# Patient Record
Sex: FEMALE | Race: WHITE | NOT HISPANIC OR LATINO | Employment: OTHER | URBAN - METROPOLITAN AREA
[De-identification: names, ages, dates, MRNs, and addresses within clinical notes are randomized per-mention and may not be internally consistent; named-entity substitution may affect disease eponyms.]

---

## 2017-01-26 DIAGNOSIS — E04.1 NONTOXIC UNINODULAR GOITER: Primary | ICD-10-CM

## 2017-01-26 RX ORDER — LEVOTHYROXINE SODIUM 100 UG/1
TABLET ORAL
Qty: 90 TABLET | Refills: 0 | OUTPATIENT
Start: 2017-01-26

## 2017-01-26 RX ORDER — LEVOTHYROXINE SODIUM 100 UG/1
100 TABLET ORAL DAILY
Qty: 30 TABLET | Refills: 0 | COMMUNITY
Start: 2017-01-26 | End: 2017-02-02

## 2017-01-26 NOTE — TELEPHONE ENCOUNTER
Called in 30 today for pt  Levothyroxine Nuno Simmons  Pt has an appt on 2-2-2017 with LES  Eves  [phone] (home)

## 2017-01-31 ENCOUNTER — TRANSFERRED RECORDS (OUTPATIENT)
Dept: FAMILY MEDICINE | Facility: CLINIC | Age: 67
End: 2017-01-31

## 2017-02-02 ENCOUNTER — OFFICE VISIT (OUTPATIENT)
Dept: FAMILY MEDICINE | Facility: CLINIC | Age: 67
End: 2017-02-02

## 2017-02-02 VITALS
OXYGEN SATURATION: 98 % | WEIGHT: 223 LBS | DIASTOLIC BLOOD PRESSURE: 80 MMHG | HEART RATE: 70 BPM | BODY MASS INDEX: 38.07 KG/M2 | HEIGHT: 64 IN | RESPIRATION RATE: 16 BRPM | SYSTOLIC BLOOD PRESSURE: 120 MMHG | TEMPERATURE: 98.2 F

## 2017-02-02 DIAGNOSIS — Z13.220 SCREENING CHOLESTEROL LEVEL: ICD-10-CM

## 2017-02-02 DIAGNOSIS — Z13.1 SCREENING FOR DIABETES MELLITUS: ICD-10-CM

## 2017-02-02 DIAGNOSIS — L71.9 ROSACEA: ICD-10-CM

## 2017-02-02 DIAGNOSIS — E04.1 NONTOXIC UNINODULAR GOITER: ICD-10-CM

## 2017-02-02 DIAGNOSIS — Z01.419 ENCOUNTER FOR GYNECOLOGICAL EXAMINATION WITHOUT ABNORMAL FINDING: Primary | ICD-10-CM

## 2017-02-02 DIAGNOSIS — M10.00 IDIOPATHIC GOUT, UNSPECIFIED CHRONICITY, UNSPECIFIED SITE: ICD-10-CM

## 2017-02-02 DIAGNOSIS — E66.09 OBESITY DUE TO EXCESS CALORIES, UNSPECIFIED OBESITY SEVERITY: ICD-10-CM

## 2017-02-02 LAB
GLUCOSE SERPL-MCNC: 90 MG/DL (ref 60–99)
HEMOGLOBIN: 15 G/DL (ref 11.7–15.7)

## 2017-02-02 PROCEDURE — 99212 OFFICE O/P EST SF 10 MIN: CPT | Mod: 25 | Performed by: FAMILY MEDICINE

## 2017-02-02 PROCEDURE — 85018 HEMOGLOBIN: CPT | Performed by: FAMILY MEDICINE

## 2017-02-02 PROCEDURE — G0101 CA SCREEN;PELVIC/BREAST EXAM: HCPCS | Performed by: FAMILY MEDICINE

## 2017-02-02 PROCEDURE — 99397 PER PM REEVAL EST PAT 65+ YR: CPT | Performed by: FAMILY MEDICINE

## 2017-02-02 PROCEDURE — 36415 COLL VENOUS BLD VENIPUNCTURE: CPT | Performed by: FAMILY MEDICINE

## 2017-02-02 PROCEDURE — 82947 ASSAY GLUCOSE BLOOD QUANT: CPT | Performed by: FAMILY MEDICINE

## 2017-02-02 RX ORDER — INDOMETHACIN 50 MG/1
50 CAPSULE ORAL
Qty: 90 CAPSULE | Refills: 0 | Status: SHIPPED | OUTPATIENT
Start: 2017-02-02 | End: 2019-05-02

## 2017-02-02 RX ORDER — LEVOTHYROXINE SODIUM 100 UG/1
100 TABLET ORAL DAILY
Qty: 90 TABLET | Refills: 3 | Status: SHIPPED | OUTPATIENT
Start: 2017-02-02 | End: 2018-03-19

## 2017-02-02 NOTE — Clinical Note
[facility]  [facility] [address]  [place]  [address]    February 6, 2017        Nohemi Orlando  [address]  [facility] [address]              Dear Nohemi Orlando      LAB RESULTS:     The results of your recent uric acid for gout control, Cholesterol/HDL Ratio 5.4 (desired result below 5), HDL Cholesterol 37 (desired result above 40) and LDL Cholesterol 136 (desired result below 130) were ABNORMAL and indicate a mild risk for future heart attack or stroke and a real possibility of a gout attack. See next page.    Your thyroid level was NORMAL.    A low fat diet, regular aerobic exercise like walking 30 minutes daily and weight loss is the treatment recommendations at this time. I also would consider a daily medication to help lower your risk for gout.     If you have any further questions or problems, please contact our office at [phone].          Sabrina Jose MD

## 2017-02-02 NOTE — PROGRESS NOTES
SUBJECTIVE:  Nohemi Orlando is an 67 year old  postmenopausal woman   who presents for annual gyn exam. Menopause at age 53. No   bleeding, spotting, or discharge noted.     Estrogen replacement therapy: never  CHAIM exposure: no  History of abnormal Pap smear: No  Family history of uterine or ovarian cancer: No  Regular self breast exam: Yes  History of abnormal mammogram: No  Family history of breast cancer: No  History of abnormal lipids: No      Past Medical History    Glaucoma     Comment: dr Mohr    Nontoxic uninodular goiter     Arthritis     Comment: left knee    Chronic pain     Comment: left knee    Post-menopausal bleeding     Chronic pain of left knee     Complex endometrial hyperplasia with atypia 2013    Atypical endometrial hyperplasia 3/6/2013         Family History    Eye Disorder Mother     Comment: glaucoma    GASTROINTESTINAL DISEASE Brother     Comment: crohns    Cancer - colorectal Brother     Comment: anal cancer     Genetic Disorder Child     Comment: gout         Past Surgical History    C THYR LOBECTOMY+PART REMOV CONTRA      Comment right lobe, colloid adenoma, dr miguel WIN ANESTH, SECTION      HC COLONOSCOPY THRU STOMA, DIAGNOSTIC      Comment negative     ORTHOPEDIC SURGERY      Comment right knee scope    DILATION AND CURETTAGE, HYSTEROSCOPY DIAGNOSTIC, COMBINED  2013    Comment Procedure: COMBINED DILATION AND CURETTAGE, HYSTEROSCOPY DIAGNOSTIC;  DILATION AND CURETTAGE, HYSTEROSCOPY DIAGNOSTIC ;  Surgeon: Patty Walker MD;  Location: [facility] OR    DAVINCI HYSTERECTOMY TOTAL, BILATERAL SALPINGO-OOPHORECTOMY, COMBINED  2013    Comment Procedure: COMBINED DAVINCI HYSTERECTOMY TOTAL, SALPINGO-OOPHORECTOMY;  DAVINCI LAPAROSCOPIC ASSISTED TOTAL HYSTERECTOMY, BILATERAL SALPINGO OOPHORECTOMY;  Surgeon: Manohar Peñaloza MD;  Location: [facility] OR    GLAUCOMA SURGERY  oct 2015    Comment both eyes, stents in,     CATARACT IOL, RT/LT  oct 2015    Comment both eyes,   "      Current Outpatient Prescriptions:  levothyroxine (SYNTHROID/LEVOTHROID) 100 MCG tablet   indomethacin (INDOCIN) 50 MG capsule   No current facility-administered medications for this visit.   -- No Known Allergies        Smoking status: Never Smoker     Smokeless tobacco: Never Used    Alcohol Use: No    Comment: maybe 1 time a year       Review Of Systems  Ears/Nose/Throat: negative  Respiratory: No shortness of breath, dyspnea on exertion, cough, or hemoptysis  Cardiovascular: negative  Gastrointestinal: colon polyps /recheck 2018  Genitourinary: negative    OBJECTIVE:  /80 mmHg  Pulse 70  Temp(Src) 98.2  F (36.8  C) (Oral)  Resp 16  Ht 1.619 m (5' 3.75\")  Wt 101.152 kg (223 lb)  BMI 38.59 kg/m2  SpO2 98%  General appearance: healthy, alert, no distress, cooperative, smiling and over weight  Skin: Skin color, texture, turgor normal. Telangectasia of the face/ rosacea noted  Ears: negative  Nose/Sinuses: Nares normal. Septum midline. Mucosa normal. No drainage or sinus tenderness.  Oropharynx: Lips, mucosa, and tongue normal. Teeth and gums normal.  Neck: Neck supple. No adenopathy. Thyroid symmetric, normal size,, Carotids without bruits.  Lungs: negative, Percussion normal. Good diaphragmatic excursion. Lungs clear  Heart: negative, PMI normal. No lifts, heaves, or thrills. RRR. No murmurs, clicks gallops or rub  Breasts: Inspection negative. No nipple discharge or bleeding. No masses.  Abdomen: Abdomen soft, non-tender. BS normal. No masses, organomegaly, positive findings: obese  Pelvic: External genitalia and vagina normal. Bimanual and rectovaginal normal., positive findings:  vaginal mucosa atrophy, empty pelvis  BMI : Body mass index is 38.59 kg/(m^2).      ASSESSMENT:(Z01.419) Encounter for gynecological examination without abnormal finding  (primary encounter diagnosis)  Plan: VENIPUNC FNGR,HEEL,EAR [[id number]], HEMOGLOBIN         [48662.000], CANCELED: ThinPrep Pap and HPV  ([facility])        " Total calcium intake of 1500 mgm/day, vitamin D 400-800IU/day and a regular weight bearing exercise program for prevention of osteoporosis is recommended treatment at this time.      (E04.1) Nontoxic uninodular goiter  Plan: VENIPUNC FNGR,HEEL,EAR [17251], TSH,         levothyroxine (SYNTHROID/LEVOTHROID) 100 MCG         tablet        Await labs/ refilled one year    (E66.09) Obesity due to excess calories, unspecified obesity severity  Plan: VENIPUNC FNGR,HEEL,EAR [64390], LIPID PANEL,         TSH, Glucose Fasting (BFP)        A low fat diet, regular aerobic exercise like walking 30 minutes daily and weight control is the treatment recommendations at this time      (M10.00) Idiopathic gout, unspecified chronicity, unspecified site  Plan: VENIPUNC FNGR,HEEL,EAR [31162], URIC ACID,         indomethacin (INDOCIN) 50 MG capsule        Diet reviewed. Potential medication side effects were discussed with the patient; let me know if any occur.      (L71.9) Rosacea  Plan: metroNIDAZOLE (METROCREAM) 0.75 % cream        Gentle skin care reviewed/monitor    (Z13.1) Screening for diabetes mellitus  Plan: VENIPUNC FNGR,HEEL,EAR [18289], Glucose Fasting        (BFP)            (Z13.220) Screening cholesterol level  Plan: VENIPUNC FNGR,HEEL,EAR [64564], LIPID PANEL               Dx:  1)  Pap smear, mammogram  2)  Lipids at appropriate intervals    PE:  Reviewed health maintenance including diet, regular exercise,   estrogen replacement and periodic exams.

## 2017-02-02 NOTE — NURSING NOTE
Patient is here for a full physical exam and pap.  Pre-Visit Screening :  Immunizations : up to date    Colonoscopy : is up to date  Mammogram : is up to date  Asthma Action Plan/Test : na  PHQ9/GAD7 : na    BP done on the right arm, with a lg sized cuff.  Pulse - regular  My Chart - declines    CLASSIFICATION OF OVERWEIGHT AND OBESITY BY BMI                         Obesity Class           BMI(kg/m2)  Underweight                                    < 18.5  Normal                                         18.5-24.9  Overweight                                     25.0-29.9  OBESITY                     I                  30.0-34.9                              II                 35.0-39.9  EXTREME OBESITY             III                >40                             Patient's  BMI Body mass index is 38.59 kg/(m^2).  http://hin.nhlbi.nih.gov/menuplanner/menu.cgi  Questioned patient about current smoking habits.  Pt. has never smoked.  ETOH screening:  Questions:  1-How often do you have a drink containing alcohol?                             0 times per never  2-How many drinks containing alcohol do you have on a typical day when you are         Drinking?                              never   3- How often do you have 5 or more drinks on one occasion?                              never per never    Have you ever:  @None of the patient's responses to the CAGE screening were positive / Negative CAGE score@

## 2017-02-02 NOTE — PATIENT INSTRUCTIONS
Total calcium intake of 1500 mgm/day, vitamin D 400-800IU/day and a regular weight bearing exercise program for prevention of osteoporosis is recommended treatment at this time.    A low fat diet, regular aerobic exercise like walking 30 minutes daily and weight control is the treatment recommendations at this time

## 2017-02-02 NOTE — MR AVS SNAPSHOT
After Visit Summary   2/2/2017    Nohemi Orlando    MRN: [id number]           Patient Information     Date Of Birth          1950        Visit Information        Provider Department      2/2/2017 10:15 AM Sabrina Jose MD [facility]        Today's Diagnoses     Encounter for gynecological examination without abnormal finding    -  1     Nontoxic uninodular goiter         Obesity due to excess calories, unspecified obesity severity         Screening for diabetes mellitus         Screening cholesterol level         Idiopathic gout, unspecified chronicity, unspecified site           Care Instructions    Total calcium intake of 1500 mgm/day, vitamin D 400-800IU/day and a regular weight bearing exercise program for prevention of osteoporosis is recommended treatment at this time.    A low fat diet, regular aerobic exercise like walking 30 minutes daily and weight control is the treatment recommendations at this time          Follow-ups after your visit        Who to contact     If you have questions or need follow up information about today's clinic visit or your schedule please contact [facility] directly at [phone].  Normal or non-critical lab and imaging results will be communicated to you by MyChart, letter or phone within 4 business days after the clinic has received the results. If you do not hear from us within 7 days, please contact the clinic through JamHub[employer] or phone. If you have a critical or abnormal lab result, we will notify you by phone as soon as possible.  Submit refill requests through Leikr or call your pharmacy and they will forward the refill request to us. Please allow 3 business days for your refill to be completed.          Additional Information About Your Visit        Care EveryWhere ID     This is your Care EveryWhere ID. This could be used by other organizations to access your [facility] medical records  [id number]    "     Your Vitals Were     Pulse Temperature Respirations Height BMI (Body Mass Index) Pulse Oximetry    70 98.2  F (36.8  C) (Oral) 16 1.619 m (5' 3.75\") 38.59 kg/m2 98%       Blood Pressure from Last 3 Encounters:   02/02/17 120/80   01/06/16 138/80   12/14/15 120/80    Weight from Last 3 Encounters:   02/02/17 101.152 kg (223 lb)   01/06/16 102.785 kg (226 lb 9.6 oz)   12/14/15 103.874 kg (229 lb)              We Performed the Following     Glucose Fasting (BFP)     HEMOGLOBIN [89431.000]     LIPID PANEL     TSH     URIC ACID     VENIPUNC FNGR,HEEL,EAR [[id number]]        Primary Care Provider Office Phone # Fax #    Sabrina Jose [phone]       [facility] [address] [facility]  [address]  [facility] [address]        Thank you!     Thank you for choosing [facility]  for your care. Our goal is always to provide you with excellent care. Hearing back from our patients is one way we can continue to improve our services. Please take a few minutes to complete the written survey that you may receive in the mail after your visit with us. Thank you!             Your Updated Medication List - Protect others around you: Learn how to safely use, store and throw away your medicines at www.disposemymeds.org.          This list is accurate as of: 2/2/17 11:14 AM.  Always use your most recent med list.                   Brand Name Dispense Instructions for use    indomethacin 50 MG capsule    INDOCIN    30 capsule    Take 1 capsule (50 mg) by mouth 3 times daily (with meals)       levothyroxine 100 MCG tablet    SYNTHROID/LEVOTHROID    30 tablet    Take 1 tablet (100 mcg) by mouth daily         "

## 2017-02-03 LAB
CHOLEST SERPL-MCNC: 199 MG/DL (ref 125–200)
CHOLEST/HDLC SERPL: 5.4 (CALC)
HDLC SERPL-MCNC: 37 MG/DL
LDLC SERPL CALC-MCNC: 136 MG/DL (CALC)
NONHDLC SERPL-MCNC: 162 MG/DL (CALC)
TRIGL SERPL-MCNC: 131 MG/DL
TSH SERPL-ACNC: 0.67 MIU/L (ref 0.4–4.5)
URATE SERPL-MCNC: 9.1 MG/DL (ref 2.5–7)

## 2018-03-16 ENCOUNTER — TRANSFERRED RECORDS (OUTPATIENT)
Dept: FAMILY MEDICINE | Facility: CLINIC | Age: 68
End: 2018-03-16

## 2018-03-19 DIAGNOSIS — L71.9 ROSACEA: ICD-10-CM

## 2018-03-19 DIAGNOSIS — E04.1 NONTOXIC UNINODULAR GOITER: ICD-10-CM

## 2018-03-19 RX ORDER — LEVOTHYROXINE SODIUM 100 UG/1
TABLET ORAL
Qty: 30 TABLET | Refills: 0 | Status: SHIPPED | OUTPATIENT
Start: 2018-03-19 | End: 2018-04-02

## 2018-03-19 NOTE — TELEPHONE ENCOUNTER
Pending Prescriptions:                       Disp   Refills    metroNIDAZOLE (METROCREAM) 0.75 % cream [*45 g                Sig: APPLY EXTERNALLY TO THE AFFECTED AREA TWICE DAILY    levothyroxine (SYNTHROID/LEVOTHROID) 100 *30 tab*0            Sig: TAKE 1 TABLET(100 MCG) BY MOUTH DAILY    Will you review for LES please:    Pt has a px on 4-2-2018 with LES  Please fax 30 days worth  Please fax and close encounter  Eves  [phone] (home)

## 2018-04-02 ENCOUNTER — OFFICE VISIT (OUTPATIENT)
Dept: FAMILY MEDICINE | Facility: CLINIC | Age: 68
End: 2018-04-02

## 2018-04-02 VITALS
DIASTOLIC BLOOD PRESSURE: 88 MMHG | BODY MASS INDEX: 38.31 KG/M2 | TEMPERATURE: 97.8 F | SYSTOLIC BLOOD PRESSURE: 138 MMHG | HEART RATE: 66 BPM | HEIGHT: 64 IN | RESPIRATION RATE: 16 BRPM | WEIGHT: 224.4 LBS | OXYGEN SATURATION: 98 %

## 2018-04-02 DIAGNOSIS — Z01.411 ENCOUNTER FOR GYNECOLOGICAL EXAMINATION WITH ABNORMAL FINDING: Primary | ICD-10-CM

## 2018-04-02 DIAGNOSIS — M10.071 ACUTE IDIOPATHIC GOUT OF RIGHT FOOT: ICD-10-CM

## 2018-04-02 DIAGNOSIS — M17.12 PRIMARY LOCALIZED OSTEOARTHROSIS OF LEFT LOWER LEG: ICD-10-CM

## 2018-04-02 DIAGNOSIS — E04.1 NONTOXIC UNINODULAR GOITER: ICD-10-CM

## 2018-04-02 DIAGNOSIS — L71.9 ROSACEA: ICD-10-CM

## 2018-04-02 DIAGNOSIS — Z13.1 SCREENING FOR DIABETES MELLITUS: ICD-10-CM

## 2018-04-02 DIAGNOSIS — Z13.220 SCREENING CHOLESTEROL LEVEL: ICD-10-CM

## 2018-04-02 DIAGNOSIS — Z23 NEED FOR VACCINATION: ICD-10-CM

## 2018-04-02 DIAGNOSIS — E66.09 OBESITY DUE TO EXCESS CALORIES, UNSPECIFIED OBESITY SEVERITY: ICD-10-CM

## 2018-04-02 DIAGNOSIS — M67.432 GANGLION OF WRIST, LEFT: ICD-10-CM

## 2018-04-02 LAB
% GRANULOCYTES: 61.7 %
HCT VFR BLD AUTO: 44.4 % (ref 35–47)
HEMOGLOBIN: 15.1 G/DL (ref 11.7–15.7)
LYMPHOCYTES NFR BLD AUTO: 25.7 %
MCH RBC QN AUTO: 30.3 PG (ref 26–33)
MCHC RBC AUTO-ENTMCNC: 34 G/DL (ref 31–36)
MCV RBC AUTO: 89.2 FL (ref 78–100)
MONOCYTES NFR BLD AUTO: 12.6 %
PLATELET COUNT - QUEST: 280 10^9/L (ref 150–375)
RBC # BLD AUTO: 4.98 10*12/L (ref 3.8–5.2)
WBC # BLD AUTO: 7.1 10*9/L (ref 4–11)

## 2018-04-02 PROCEDURE — 99212 OFFICE O/P EST SF 10 MIN: CPT | Mod: 25 | Performed by: FAMILY MEDICINE

## 2018-04-02 PROCEDURE — 85025 COMPLETE CBC W/AUTO DIFF WBC: CPT | Performed by: FAMILY MEDICINE

## 2018-04-02 PROCEDURE — 90732 PPSV23 VACC 2 YRS+ SUBQ/IM: CPT | Performed by: FAMILY MEDICINE

## 2018-04-02 PROCEDURE — 90750 HZV VACC RECOMBINANT IM: CPT | Performed by: FAMILY MEDICINE

## 2018-04-02 PROCEDURE — 36415 COLL VENOUS BLD VENIPUNCTURE: CPT | Performed by: FAMILY MEDICINE

## 2018-04-02 PROCEDURE — G0009 ADMIN PNEUMOCOCCAL VACCINE: HCPCS | Mod: 59 | Performed by: FAMILY MEDICINE

## 2018-04-02 PROCEDURE — 90471 IMMUNIZATION ADMIN: CPT | Performed by: FAMILY MEDICINE

## 2018-04-02 PROCEDURE — G0101 CA SCREEN;PELVIC/BREAST EXAM: HCPCS | Performed by: FAMILY MEDICINE

## 2018-04-02 PROCEDURE — 99397 PER PM REEVAL EST PAT 65+ YR: CPT | Performed by: FAMILY MEDICINE

## 2018-04-02 RX ORDER — LEVOTHYROXINE SODIUM 100 UG/1
TABLET ORAL
Qty: 90 TABLET | Refills: 3 | Status: SHIPPED | OUTPATIENT
Start: 2018-04-02 | End: 2019-05-02

## 2018-04-02 NOTE — PATIENT INSTRUCTIONS
Total calcium intake of 1500 mgm/day, vitamin D 400-800IU/day and a regular weight bearing exercise program for prevention of osteoporosis is recommended treatment at this time. Agree with your weight loss plan/ very important    Comment: await thyroid level        Refilled one year     Rosacea  Plan: metroNIDAZOLE (METROCREAM) 0.75 % cream refilled        DERMATOLOGY REFERRAL        Schedule dermatology consultation     Acute idiopathic gout of  Plan: URIC ACID ([facility])        Follow diet     Primary localized osteoarthrosis of left lower leg and your ganglion  Plan: ? Back to DR Nils Madrigal, [facility] if problem

## 2018-04-02 NOTE — PROGRESS NOTES
SUBJECTIVE:  Nohemi Orlando is an 68 year old  postmenopausal woman   who presents for annual gyn exam. She would like to refill her thyroid medication, gout medication and rosacea care understanding this is not a part of a preventative exam.    Her left knee is painful with prolonged walking using ibuprofen, one gout attack in left toe this year treated with indocin and diet, bump on her left wrist for many years/ sometimes thumb pain     Menopause at age 53. No   bleeding, spotting, or discharge noted.     Estrogen replacement therapy: never  CHAIM exposure: no  History of abnormal Pap smear: No  Family history of uterine or ovarian cancer: No  Regular self breast exam: Yes  History of abnormal mammogram: No  Family history of breast cancer: No  History of abnormal lipids: No      Past Medical History:   Diagnosis Date     Acute idiopathic gout of right foot 2018     Allergic rhinitis      Problem list name updated by automated process. Provider to review     Arthritis     left knee     Atypical endometrial hyperplasia 3/6/2013     Chronic pain     left knee     Chronic pain of left knee      Glaucoma     dr Mohr     Nontoxic uninodular goiter      Obesity due to excess calories, unspecified obesity severity 2017     Open-angle glaucoma      Problem list name updated by automated process. Provider to review     Rosacea 2017         Family History    Eye Disorder Mother     Comment: glaucoma    GASTROINTESTINAL DISEASE Brother     Comment: crohns    Cancer - colorectal Brother     Comment: anal cancer     Genetic Disorder Child     Comment: gout       Past Surgical History:  No date: C ANESTH, SECTION  1987: C THYR LOBECTOMY+PART REMOV CONTRA     Comment: right lobe, colloid adenoma, dr rivera  oct 2015: CATARACT IOL, RT/LT     Comment: both eyes,   2013: DAVINCI HYSTERECTOMY TOTAL, BILATERAL SALPINGO*     Comment: Procedure: COMBINED DAVINCI HYSTERECTOMY               TOTAL,  "SALPINGO-OOPHORECTOMY;  LAURA               LAPAROSCOPIC ASSISTED TOTAL HYSTERECTOMY,               BILATERAL SALPINGO OOPHORECTOMY;  Surgeon:               Manohar Peñaloza MD;  Location:  OR  2/13/2013: DILATION AND CURETTAGE, HYSTEROSCOPY DIAGNOSTI*     Comment: Procedure: COMBINED DILATION AND CURETTAGE,               HYSTEROSCOPY DIAGNOSTIC;  DILATION AND               CURETTAGE, HYSTEROSCOPY DIAGNOSTIC ;  Surgeon:               Patty Walker MD;  Location:  OR  oct 2015: GLAUCOMA SURGERY     Comment: both eyes, stents in,   1999: HC COLONOSCOPY THRU STOMA, DIAGNOSTIC     Comment: negative   No date: ORTHOPEDIC SURGERY     Comment: right knee scope    Current Outpatient Prescriptions:  metroNIDAZOLE (METROCREAM) 0.75 % cream   levothyroxine (SYNTHROID/LEVOTHROID) 100 MCG tablet   indomethacin (INDOCIN) 50 MG capsule   No current facility-administered medications for this visit.    -- No Known Allergies        Smoking status: Never Smoker    Smokeless tobacco: Never Used    Alcohol use No    Comment: maybe 1 time a year       Review Of Systems  Ears/Nose/Throat: hypothyroid/ allergies  Respiratory: No shortness of breath, dyspnea on exertion, cough, or hemoptysis  Cardiovascular: negative  Gastrointestinal: colon polyps  Genitourinary: negative  MS: osteoarthritis of the knees/ ankle and feet/ ? Gout/ h/o left knee scope    OBJECTIVE:  /90 (BP Location: Right arm, Patient Position: Chair, Cuff Size: Adult Large)  Pulse 66  Temp 97.8  F (36.6  C) (Oral)  Resp 16  Ht 1.619 m (5' 3.75\")  Wt 101.8 kg (224 lb 6.4 oz)  LMP  (LMP Unknown)  SpO2 98%  Breastfeeding? No  BMI 38.82 kg/m2  General appearance: healthy, alert, no distress, cooperative, smiling and over weight  Skin: positives: facial papules with telangiectasias over the nose and cheek  Ears: negative  Nose/Sinuses: Nares normal. Septum midline. Mucosa normal. No drainage or sinus tenderness.  Oropharynx: Lips, mucosa, and tongue " normal. Teeth and gums normal.  Neck: Neck supple. No adenopathy. Thyroid symmetric, normal size,, Carotids without bruits.  Lungs: negative, Percussion normal. Good diaphragmatic excursion. Lungs clear  Heart: negative, PMI normal. No lifts, heaves, or thrills. RRR. No murmurs, clicks gallops or rub  Breasts: Inspection negative. No nipple discharge or bleeding. No masses.  Abdomen: Abdomen soft, non-tender. BS normal. No masses, organomegaly, positive findings: obese  Pelvic: External genitalia and vagina normal. Bimanual and rectovaginal normal., positive findings:  Empty pelvis  BMI : Body mass index is 38.82 kg/(m^2).    ASSESSMENT:(Z01.411) Encounter for gynecological examination with abnormal finding  (primary encounter diagnosis)  Plan: VENIPUNC FNGR,HEEL,EAR [59868], AUTO         HEMOGRAM/PLATE/DIFF [64045.000]        Total calcium intake of 1500 mgm/day, vitamin D 400-800IU/day and a regular weight bearing exercise program for prevention of osteoporosis is recommended treatment at this time.      (E04.1) Nontoxic uninodular goiter  Comment: await thyroid level  Plan: VENIPUNC FNGR,HEEL,EAR [32386], TSH,         levothyroxine (SYNTHROID/LEVOTHROID) 100 MCG         tablet        Refilled one year    (L71.9) Rosacea  Plan: metroNIDAZOLE (METROCREAM) 0.75 % cream,         DERMATOLOGY REFERRAL        Schedule dermatology consultation    (M10.071) Acute idiopathic gout of right foot  Plan: URIC ACID ([facility])        Follow diet    (M17.12) Primary localized osteoarthrosis of left lower leg  Plan: ? Back to [facility]    (E66.09) Obesity due to excess calories, unspecified obesity severity  Plan: VENIPUNC FNGR,HEEL,EAR [18715], TSH, LIPID         PANEL, COMPREHENSIVE METABOLIC PANEL        Weight loss important    (Z23) Need for vaccination  Plan: PNEUMOCOCCAL VACCINE,ADULT,SQ OR IM, VACCINE         ADMINISTRATION, EACH ADDITIONAL, VACCINE         ADMINISTRATION, INITIAL, C ZOSTER VACCINE          RECOMBINANT ADJUVANTED IM NJX            (Z13.220) Screening cholesterol level  Plan: VENIPUNC FNGR,HEEL,EAR [23583], LIPID PANEL            (Z13.1) Screening for diabetes mellitus  Plan: VENIPUNC FNGR,HEEL,EAR [41012], COMPREHENSIVE         METABOLIC PANEL                Dx:  1)  Pap smear, mammogram  2)  Lipids at appropriate intervals    PE:  Reviewed health maintenance including diet, regular exercise,   estrogen replacement and periodic exams.referral derma

## 2018-04-02 NOTE — MR AVS SNAPSHOT
After Visit Summary   4/2/2018    Nohemi Orlando    MRN: [id number]           Patient Information     Date Of Birth          1950        Visit Information        Provider Department      4/2/2018 9:30 AM Sabrina Jose MD [facility]        Today's Diagnoses     Encounter for gynecological examination with abnormal finding    -  1    Nontoxic uninodular goiter        Rosacea        Acute idiopathic gout of right foot        Primary localized osteoarthrosis of left lower leg        Obesity due to excess calories, unspecified obesity severity        Ganglion of wrist, left        Screening cholesterol level        Screening for diabetes mellitus        Need for vaccination          Care Instructions            Total calcium intake of 1500 mgm/day, vitamin D 400-800IU/day and a regular weight bearing exercise program for prevention of osteoporosis is recommended treatment at this time. Agree with your weight loss plan/ very important    Comment: await thyroid level        Refilled one year     Rosacea  Plan: metroNIDAZOLE (METROCREAM) 0.75 % cream refilled        DERMATOLOGY REFERRAL        Schedule dermatology consultation     Acute idiopathic gout of  Plan: URIC ACID ([facility])        Follow diet     Primary localized osteoarthrosis of left lower leg and your ganglion  Plan: ? Back to [facility] if problem              Follow-ups after your visit        Additional Services     DERMATOLOGY REFERRAL       Your provider has referred you to: N: [facility] [phone]   [email or web address]    Please be aware that coverage of these services is subject to the terms and limitations of your health insurance plan.  Call member services at your health plan with any benefit or coverage questions.      Please bring the following with you to your appointment:    (1) Any X-Rays, CTs or MRIs which have been performed.   "Contact the facility where they were done to arrange for  prior to your scheduled appointment.    (2) List of current medications  (3) This referral request   (4) Any documents/labs given to you for this referral                  Follow-up notes from your care team     Return in about 1 year (around 4/2/2019).      Who to contact     If you have questions or need follow up information about today's clinic visit or your schedule please contact [facility] directly at [phone].  Normal or non-critical lab and imaging results will be communicated to you by MyChart, letter or phone within 4 business days after the clinic has received the results. If you do not hear from us within 7 days, please contact the clinic through MyChart or phone. If you have a critical or abnormal lab result, we will notify you by phone as soon as possible.  Submit refill requests through [employer] or call your pharmacy and they will forward the refill request to us. Please allow 3 business days for your refill to be completed.          Additional Information About Your Visit        Care EveryWhere ID     This is your Care EveryWhere ID. This could be used by other organizations to access your [facility] medical records  [id number]        Your Vitals Were     Pulse Temperature Respirations Height Last Period Pulse Oximetry    66 97.8  F (36.6  C) (Oral) 16 1.619 m (5' 3.75\") (LMP Unknown) 98%    Breastfeeding? BMI (Body Mass Index)                No 38.82 kg/m2           Blood Pressure from Last 3 Encounters:   04/02/18 138/88   02/02/17 120/80   01/06/16 138/80    Weight from Last 3 Encounters:   04/02/18 101.8 kg (224 lb 6.4 oz)   02/02/17 101.2 kg (223 lb)   01/06/16 102.8 kg (226 lb 9.6 oz)              We Performed the Following     AUTO HEMOGRAM/PLATE/DIFF [68943.000]     C ZOSTER VACCINE RECOMBINANT ADJUVANTED IM NJX     COMPREHENSIVE METABOLIC PANEL     DERMATOLOGY REFERRAL     LIPID PANEL     " PNEUMOCOCCAL VACCINE,ADULT,SQ OR IM     TSH     URIC ACID (QUEST)     VACCINE ADMINISTRATION, EACH ADDITIONAL     VACCINE ADMINISTRATION, INITIAL     VENIPUNC FNGR,HEEL,EAR [71773]          Today's Medication Changes          These changes are accurate as of 4/2/18 10:38 AM.  If you have any questions, ask your nurse or doctor.               These medicines have changed or have updated prescriptions.        Dose/Directions    levothyroxine 100 MCG tablet   Commonly known as:  SYNTHROID/LEVOTHROID   This may have changed:  See the new instructions.   Used for:  Nontoxic uninodular goiter   Changed by:  Sabrina Jose MD        TAKE 1 TABLET(100 MCG) BY MOUTH DAILY   Quantity:  90 tablet   Refills:  3       metroNIDAZOLE 0.75 % cream   Commonly known as:  METROCREAM   This may have changed:  See the new instructions.   Used for:  Rosacea   Changed by:  Sabrina Jose MD        APPLY EXTERNALLY TO THE AFFECTED AREA TWICE DAILY   Quantity:  45 g   Refills:  6            Where to get your medicines      These medications were sent to [facility] [address] [place] [address] [address] AT [place] [address] [address], [facility] [address]    Hours:  24-hours Phone:  [phone]     levothyroxine 100 MCG tablet    metroNIDAZOLE 0.75 % cream                Primary Care Provider Office Phone # Fax #    Sabrina Jose [phone] [address] [address]        Equal Access to Services     [facility] AH: Hadii jessica ku hadsarao Soyazmin, waaxda luqadaha, qaybta kaalmada adeegyada, fabby gonzales. So [facility] [phone].    ATENCIÓN: Si habla español, tiene a diego disposición servicios gratuitos de asistencia lingüística. Llame al [phone].    We comply with applicable federal civil rights laws and [place] laws. We do not discriminate on the basis of race, color, national origin, age, disability, sex, sexual  orientation, or gender identity.            Thank you!     Thank you for choosing [facility]  for your care. Our goal is always to provide you with excellent care. Hearing back from our patients is one way we can continue to improve our services. Please take a few minutes to complete the written survey that you may receive in the mail after your visit with us. Thank you!             Your Updated Medication List - Protect others around you: Learn how to safely use, store and throw away your medicines at www.disposemymeds.org.          This list is accurate as of 4/2/18 10:38 AM.  Always use your most recent med list.                   Brand Name Dispense Instructions for use Diagnosis    indomethacin 50 MG capsule    INDOCIN    90 capsule    Take 1 capsule (50 mg) by mouth 3 times daily (with meals)    Idiopathic gout, unspecified chronicity, unspecified site       levothyroxine 100 MCG tablet    SYNTHROID/LEVOTHROID    90 tablet    TAKE 1 TABLET(100 MCG) BY MOUTH DAILY    Nontoxic uninodular goiter       metroNIDAZOLE 0.75 % cream    METROCREAM    45 g    APPLY EXTERNALLY TO THE AFFECTED AREA TWICE DAILY    Rosacea

## 2018-04-02 NOTE — NURSING NOTE
Nohemi CHRISTINE Orlando is here for a full physical exam.     FASTING: Yes HOURS: 12+    Pre-Visit Screening :    Immunizations : not up to date - Pneumococcal 23 Due Today and also getting Shingrix  Colon Screening : is up to date  Mammogram: is up to date  Asthma Action Test/Plan : NA  PHQ2 :  is completed today (Score: 0)  GAD7 :  NA  Fall Risk Assessment: is completed today (1 fall w/ no injury)    Patient's  BMI Body mass index is 38.82 kg/(m^2).    Questioned patient about current smoking habits.  Pt. has never smoked.    ETOH screening:    Questions:  1-How often do you have a drink containing alcohol?                             1 times per year(s)  2-How many drinks containing alcohol do you have on a typical day when you are         Drinking?                             1   3- How often do you have 5 or more drinks on one occasion?                             Never       Is it okay to leave a detailed message on home phone's voicemail regarding today's visit? Yes    Phone # [phone]      Roomed By: Rubia Doyle CMA

## 2018-04-02 NOTE — LETTER
April 4, 2018      Nohemi Orlando  [address]  [facility] [address]        Dear Ms.Darion,    We are writing to inform you of your test results.    Normal thyroid level.  ELEVATED URIC ACID PUTTING YOU AT RISK FOR A GOUT ATTACK. I encourage you to consider starting a medication to lower uric acid levels. See handout.    Normal metabolic panel including kidney and liver function testing.  Your HDL( good) cholesterol is low. A low fat diet, regular aerobic exercise like walking 30 minutes daily and weight control is the treatment recommendations at this time.        If you have any questions or concerns, please call the clinic at the number listed above.       Sincerely,        Sabrina Jose MD

## 2018-04-03 LAB
ALBUMIN SERPL-MCNC: 4.1 G/DL (ref 3.6–5.1)
ALBUMIN/GLOB SERPL: 1.4 (CALC) (ref 1–2.5)
ALP SERPL-CCNC: 69 U/L (ref 33–130)
ALT SERPL-CCNC: 17 U/L (ref 6–29)
AST SERPL-CCNC: 19 U/L (ref 10–35)
BILIRUB SERPL-MCNC: 0.6 MG/DL (ref 0.2–1.2)
BUN SERPL-MCNC: 19 MG/DL (ref 7–25)
BUN/CREATININE RATIO: 20 (CALC) (ref 6–22)
CALCIUM SERPL-MCNC: 9.8 MG/DL (ref 8.6–10.4)
CHLORIDE SERPLBLD-SCNC: 104 MMOL/L (ref 98–110)
CHOLEST SERPL-MCNC: 187 MG/DL
CHOLEST/HDLC SERPL: 4.9 (CALC)
CO2 SERPL-SCNC: 28 MMOL/L (ref 20–31)
CREAT SERPL-MCNC: 0.94 MG/DL (ref 0.6–0.93)
EGFR AFRICAN AMERICAN - QUEST: 71 ML/MIN/1.73M2
GFR SERPL CREATININE-BSD FRML MDRD: 61 ML/MIN/1.73M2
GLOBULIN, CALCULATED - QUEST: 3 G/DL (CALC) (ref 1.9–3.7)
GLUCOSE - QUEST: 87 MG/DL (ref 65–99)
HDLC SERPL-MCNC: 38 MG/DL
LDLC SERPL CALC-MCNC: 121 MG/DL (CALC)
NONHDLC SERPL-MCNC: 149 MG/DL (CALC)
POTASSIUM SERPL-SCNC: 4.9 MMOL/L (ref 3.5–5.3)
PROT SERPL-MCNC: 7.1 G/DL (ref 6.1–8.1)
SODIUM SERPL-SCNC: 141 MMOL/L (ref 135–146)
TRIGL SERPL-MCNC: 161 MG/DL
TSH SERPL-ACNC: 0.65 MIU/L (ref 0.4–4.5)
URATE SERPL-MCNC: 7.9 MG/DL (ref 2.5–7)

## 2018-06-06 ENCOUNTER — ALLIED HEALTH/NURSE VISIT (OUTPATIENT)
Dept: FAMILY MEDICINE | Facility: CLINIC | Age: 68
End: 2018-06-06

## 2018-06-06 DIAGNOSIS — Z23 NEED FOR VACCINATION: Primary | ICD-10-CM

## 2018-06-06 PROCEDURE — 90471 IMMUNIZATION ADMIN: CPT | Performed by: FAMILY MEDICINE

## 2018-06-06 PROCEDURE — 90750 HZV VACC RECOMBINANT IM: CPT | Performed by: FAMILY MEDICINE

## 2018-06-06 NOTE — MR AVS SNAPSHOT
After Visit Summary   6/6/2018    Nohemi Orlando    MRN: [id number]           Patient Information     Date Of Birth          1950        Visit Information        Provider Department      6/6/2018 10:00 AM Sabrina Jose MD [facility]        Today's Diagnoses     Need for vaccination    -  1       Follow-ups after your visit        Who to contact     If you have questions or need follow up information about today's clinic visit or your schedule please contact [facility] directly at [phone].  Normal or non-critical lab and imaging results will be communicated to you by MyChart, letter or phone within 4 business days after the clinic has received the results. If you do not hear from us within 7 days, please contact the clinic through MyChart or phone. If you have a critical or abnormal lab result, we will notify you by phone as soon as possible.  Submit refill requests through [employer] or call your pharmacy and they will forward the refill request to us. Please allow 3 business days for your refill to be completed.          Additional Information About Your Visit        Care EveryWhere ID     This is your Care EveryWhere ID. This could be used by other organizations to access your [facility] medical records  [id number]        Your Vitals Were     Last Period                   (LMP Unknown)            Blood Pressure from Last 3 Encounters:   04/02/18 138/88   02/02/17 120/80   01/06/16 138/80    Weight from Last 3 Encounters:   04/02/18 101.8 kg (224 lb 6.4 oz)   02/02/17 101.2 kg (223 lb)   01/06/16 102.8 kg (226 lb 9.6 oz)              We Performed the Following     HC ZOSTER VACCINE RECOMBINANT ADJUVANTED IM NJX     VACCINE ADMINISTRATION, INITIAL        Primary Care Provider Office Phone # Fax #    Sabrina Jose [phone]       [address] [address]        Equal Access to Services     YE  GAAR : Hadii aad helio ronn Salgado, wakaileeda luqadaha, qaybta kapeyman price, waxlucretia sera hayjames lealcharliejulián ash . So [facility] [phone].    ATENCIÓN: Si habla español, tiene a diego disposición servicios gratuitos de asistencia lingüística. Llame al [phone].    We comply with applicable federal civil rights laws and [place] laws. We do not discriminate on the basis of race, color, national origin, age, disability, sex, sexual orientation, or gender identity.            Thank you!     Thank you for choosing [facility]  for your care. Our goal is always to provide you with excellent care. Hearing back from our patients is one way we can continue to improve our services. Please take a few minutes to complete the written survey that you may receive in the mail after your visit with us. Thank you!             Your Updated Medication List - Protect others around you: Learn how to safely use, store and throw away your medicines at www.disposemymeds.org.          This list is accurate as of 6/6/18 10:56 AM.  Always use your most recent med list.                   Brand Name Dispense Instructions for use Diagnosis    indomethacin 50 MG capsule    INDOCIN    90 capsule    Take 1 capsule (50 mg) by mouth 3 times daily (with meals)    Idiopathic gout, unspecified chronicity, unspecified site       levothyroxine 100 MCG tablet    SYNTHROID/LEVOTHROID    90 tablet    TAKE 1 TABLET(100 MCG) BY MOUTH DAILY    Nontoxic uninodular goiter       metroNIDAZOLE 0.75 % cream    METROCREAM    45 g    APPLY EXTERNALLY TO THE AFFECTED AREA TWICE DAILY    Rosacea

## 2019-05-02 ENCOUNTER — OFFICE VISIT (OUTPATIENT)
Dept: FAMILY MEDICINE | Facility: CLINIC | Age: 69
End: 2019-05-02

## 2019-05-02 ENCOUNTER — TRANSFERRED RECORDS (OUTPATIENT)
Dept: FAMILY MEDICINE | Facility: CLINIC | Age: 69
End: 2019-05-02

## 2019-05-02 VITALS
OXYGEN SATURATION: 97 % | WEIGHT: 223.4 LBS | RESPIRATION RATE: 16 BRPM | SYSTOLIC BLOOD PRESSURE: 124 MMHG | HEART RATE: 76 BPM | DIASTOLIC BLOOD PRESSURE: 84 MMHG | TEMPERATURE: 98.3 F | BODY MASS INDEX: 38.14 KG/M2 | HEIGHT: 64 IN

## 2019-05-02 DIAGNOSIS — Z13.1 SCREENING FOR DIABETES MELLITUS: ICD-10-CM

## 2019-05-02 DIAGNOSIS — Z79.899 ENCOUNTER FOR LONG-TERM (CURRENT) USE OF MEDICATIONS: ICD-10-CM

## 2019-05-02 DIAGNOSIS — E89.0 POSTSURGICAL HYPOTHYROIDISM: ICD-10-CM

## 2019-05-02 DIAGNOSIS — Z86.0100 HISTORY OF COLONIC POLYPS: ICD-10-CM

## 2019-05-02 DIAGNOSIS — E66.01 MORBID OBESITY (H): ICD-10-CM

## 2019-05-02 DIAGNOSIS — Z12.11 ENCOUNTER FOR SCREENING FOR MALIGNANT NEOPLASM OF COLON: ICD-10-CM

## 2019-05-02 DIAGNOSIS — Z01.411 ENCOUNTER FOR GYNECOLOGICAL EXAMINATION WITH ABNORMAL FINDING: Primary | ICD-10-CM

## 2019-05-02 DIAGNOSIS — Z13.220 SCREENING CHOLESTEROL LEVEL: ICD-10-CM

## 2019-05-02 DIAGNOSIS — L71.9 ROSACEA: ICD-10-CM

## 2019-05-02 DIAGNOSIS — E04.1 NONTOXIC UNINODULAR GOITER: ICD-10-CM

## 2019-05-02 DIAGNOSIS — M10.00 IDIOPATHIC GOUT, UNSPECIFIED CHRONICITY, UNSPECIFIED SITE: ICD-10-CM

## 2019-05-02 LAB
CHOLEST SERPL-MCNC: 175 MG/DL (ref 0–199)
CHOLEST/HDLC SERPL: 5 {RATIO} (ref 0–5)
GLUCOSE SERPL-MCNC: 100 MG/DL (ref 60–99)
HDLC SERPL-MCNC: 35 MG/DL (ref 40–150)
HEMOGLOBIN: 14.8 G/DL (ref 11.7–15.7)
LDLC SERPL CALC-MCNC: 122 MG/DL (ref 0–150)
MAMMOGRAM: NORMAL
TRIGL SERPL-MCNC: 92 MG/DL (ref 0–149)

## 2019-05-02 PROCEDURE — 85018 HEMOGLOBIN: CPT | Performed by: FAMILY MEDICINE

## 2019-05-02 PROCEDURE — G0101 CA SCREEN;PELVIC/BREAST EXAM: HCPCS | Performed by: FAMILY MEDICINE

## 2019-05-02 PROCEDURE — 99397 PER PM REEVAL EST PAT 65+ YR: CPT | Performed by: FAMILY MEDICINE

## 2019-05-02 PROCEDURE — 82947 ASSAY GLUCOSE BLOOD QUANT: CPT | Performed by: FAMILY MEDICINE

## 2019-05-02 PROCEDURE — 99212 OFFICE O/P EST SF 10 MIN: CPT | Mod: 25 | Performed by: FAMILY MEDICINE

## 2019-05-02 PROCEDURE — 80061 LIPID PANEL: CPT | Performed by: FAMILY MEDICINE

## 2019-05-02 PROCEDURE — 36415 COLL VENOUS BLD VENIPUNCTURE: CPT | Performed by: FAMILY MEDICINE

## 2019-05-02 RX ORDER — INDOMETHACIN 50 MG/1
50 CAPSULE ORAL
Qty: 90 CAPSULE | Refills: 0 | Status: SHIPPED | OUTPATIENT
Start: 2019-05-02 | End: 2020-06-10

## 2019-05-02 RX ORDER — LEVOTHYROXINE SODIUM 100 UG/1
TABLET ORAL
Qty: 90 TABLET | Refills: 3 | Status: SHIPPED | OUTPATIENT
Start: 2019-05-02 | End: 2020-05-06

## 2019-05-02 SDOH — ECONOMIC STABILITY: FOOD INSECURITY: WITHIN THE PAST 12 MONTHS, THE FOOD YOU BOUGHT JUST DIDN'T LAST AND YOU DIDN'T HAVE MONEY TO GET MORE.: NEVER TRUE

## 2019-05-02 SDOH — ECONOMIC STABILITY: FOOD INSECURITY: WITHIN THE PAST 12 MONTHS, YOU WORRIED THAT YOUR FOOD WOULD RUN OUT BEFORE YOU GOT MONEY TO BUY MORE.: NEVER TRUE

## 2019-05-02 SDOH — ECONOMIC STABILITY: TRANSPORTATION INSECURITY
IN THE PAST 12 MONTHS, HAS THE LACK OF TRANSPORTATION KEPT YOU FROM MEDICAL APPOINTMENTS OR FROM GETTING MEDICATIONS?: NO

## 2019-05-02 SDOH — ECONOMIC STABILITY: INCOME INSECURITY: HOW HARD IS IT FOR YOU TO PAY FOR THE VERY BASICS LIKE FOOD, HOUSING, MEDICAL CARE, AND HEATING?: NOT HARD AT ALL

## 2019-05-02 SDOH — ECONOMIC STABILITY: TRANSPORTATION INSECURITY
IN THE PAST 12 MONTHS, HAS LACK OF TRANSPORTATION KEPT YOU FROM MEETINGS, WORK, OR FROM GETTING THINGS NEEDED FOR DAILY LIVING?: NO

## 2019-05-02 ASSESSMENT — MIFFLIN-ST. JEOR: SCORE: 1513.34

## 2019-05-02 NOTE — PROGRESS NOTES
Two notes:    1.SUBJECTIVE:  Nohemi Orlando is an 69 year old  postmenopausal woman   who presents for annual gyn exam.     She would like to refill and discuss her thyroid medication, gout and rosacea refills understanding this is not a part of a preventative PE. See second note.    Menopause at age 53. No   bleeding, spotting, or discharge noted. Hysterectomy     Estrogen replacement therapy: never  CHAIM exposure: no  History of abnormal Pap smear: No  Family history of uterine or ovarian cancer: No  Regular self breast exam: Yes  History of abnormal mammogram: No  Family history of breast cancer: No  History of abnormal lipids: No    Past Medical History:  2018: Acute idiopathic gout of right foot  No date: Allergic rhinitis     Comment:   Problem list name updated by automated process.               Provider to review  No date: Arthritis     Comment:  left knee  3/6/2013: Atypical endometrial hyperplasia  No date: Chronic pain     Comment:  left knee  No date: Chronic pain of left knee  No date: Glaucoma     Comment:  dr Mohr  No date: Nontoxic uninodular goiter  2017: Obesity due to excess calories, unspecified obesity severity  No date: Open-angle glaucoma     Comment:   Problem list name updated by automated process.               Provider to review  2017: Rosacea    Review of patient's family history indicates:  Problem: Eye Disorder     Relation: Mother         Age of Onset: (Not Specified)         Comment: glaucoma  Problem: Gastrointestinal Disease     Relation: Brother         Age of Onset: (Not Specified)         Comment: crohns  Problem: Cancer - colorectal     Relation: Brother         Age of Onset: (Not Specified)         Comment: anal cancer   Problem: Genetic Disorder     Relation: Child         Age of Onset: (Not Specified)         Comment: gout      Past Surgical History:  No date: C ANESTH, SECTION  1987: C THYR LOBECTOMY+PART REMOV CONTRA     Comment:  right  "lobe, colloid adenoma, dr rivera  oct 2015: CATARACT IOL, RT/LT     Comment:  both eyes,   4/5/2013: DAVINCI HYSTERECTOMY TOTAL, BILATERAL SALPINGO-OOPHORECTOMY,  COMBINED     Comment:  Procedure: COMBINED DAVINCI HYSTERECTOMY TOTAL,               SALPINGO-OOPHORECTOMY;  DAVINCI LAPAROSCOPIC ASSISTED               TOTAL HYSTERECTOMY, BILATERAL SALPINGO OOPHORECTOMY;                Surgeon: Manohar Peñaloza MD;  Location:  OR  2/13/2013: DILATION AND CURETTAGE, HYSTEROSCOPY DIAGNOSTIC, COMBINED     Comment:  Procedure: COMBINED DILATION AND CURETTAGE, HYSTEROSCOPY              DIAGNOSTIC;  DILATION AND CURETTAGE, HYSTEROSCOPY               DIAGNOSTIC ;  Surgeon: Patty Walker MD;  Location:                OR  oct 2015: GLAUCOMA SURGERY     Comment:  both eyes, stents in,   1999: HC COLONOSCOPY THRU STOMA, DIAGNOSTIC     Comment:  negative   No date: ORTHOPEDIC SURGERY     Comment:  right knee scope    Current Outpatient Medications:  levothyroxine (SYNTHROID/LEVOTHROID) 100 MCG tablet   metroNIDAZOLE (METROCREAM) 0.75 % cream   indomethacin (INDOCIN) 50 MG capsule   No current facility-administered medications for this visit.    -- No Known Allergies     Social History   Tobacco Use     Smoking status: Never Smoker     Smokeless tobacco: Never Used   Alcohol use: No     Alcohol/week: 0.0 oz     Comment: maybe 1 time a year      Review Of Systems  Ears/Nose/Throat: negative/ Patient denies history of cold or heat intolerance, skin or hair changes, constipation or depression.  Respiratory: No shortness of breath, dyspnea on exertion, cough, or hemoptysis  Cardiovascular: negative  Gastrointestinal: colon polyps and needs colonoscopy  Genitourinary: negative  MS: rare gout attacks/ working on diet alone    OBJECTIVE:  /84 (BP Location: Right arm, Patient Position: Sitting, Cuff Size: Adult Large)   Pulse 76   Temp 98.3  F (36.8  C) (Oral)   Ht 1.626 m (5' 4\")   Wt 101.3 kg (223 lb 6.4 oz)   LMP  (LMP " Unknown)   SpO2 97%   BMI 38.35 kg/m    General appearance: healthy, alert, no distress, cooperative, smiling and over weight  Skin: Skin color, texture, turgor normal. No lesions. Mile facial telangiectasias/tinea under her breasts  Ears: negative  Nose/Sinuses: Nares normal. Septum midline. Mucosa normal. No drainage or sinus tenderness.  Oropharynx: Lips, mucosa, and tongue normal. Teeth and gums normal.  Neck: Neck supple. No adenopathy. Thyroid symmetric, normal size,, Carotids without bruits., positive findings: healed surgical scar  Lungs: negative, Percussion normal. Good diaphragmatic excursion. Lungs clear  Heart: negative, PMI normal. No lifts, heaves, or thrills. RRR. No murmurs, clicks gallops or rub  Breasts: Inspection negative. No nipple discharge or bleeding. No masses.  Abdomen: Abdomen soft, non-tender. BS normal. No masses, organomegaly, positive findings: obese  Pelvic: External genitalia and vagina normal. Bimanual and rectovaginal normal., positive findings:  vaginal mucosa atrophy, empty pelvis  BMI : Body mass index is 38.35 kg/m .    ASSESSMENT:  (Z01.411) Encounter for gynecological examination with abnormal finding  (primary encounter diagnosis)  Plan: VENIPUNC FNGR,HEEL,EAR [95219], HEMOGLOBIN         [08419.000]        Colonoscopy recommended  Exercise encouraged  Fasting lipid profile obtained  Follow up in 1 year  High fiber, low fat diet encouraged  Mammogram recommended  Monitor size and characteristics of moles  Refills given  Routine breast self-exam encouraged  Seat belt use encouraged  Sunscreen recommended  TSH obtained  Weight loss recommended      (Z12.11) Encounter for screening for malignant neoplasm of colon  Plan: GASTROENTEROLOGY ADULT REF PROCEDURE ONLY         Other; [facility] [phone]        encouraged    (Z86.010) History of colonic polyps  Plan: I have reviewed the patient's medical history in detail and updated the computerized patient record.      (E66.01)  Morbid obesity (H)  Plan: VENIPUNC FNGR,HEEL,EAR [21904], Glucose Fasting        (BFP), Lipid Panel (BFP)        A low fat diet, regular aerobic exercise like walking 30 minutes daily and weight control is the treatment recommendations at this time      (E89.0) Postsurgical hypothyroidism  Plan: TSH with free T4 reflex ([facility])            (Z13.1) Screening for diabetes mellitus  Plan: VENIPUNC FNGR,HEEL,EAR [25305], Glucose Fasting        (BFP)            (Z13.220) Screening cholesterol level  Plan: VENIPUNC FNGR,HEEL,EAR [64982], Lipid Panel         (BFP)            (Z79.899) Encounter for long-term (current) use of medications  Plan: VENIPUNC FNGR,HEEL,EAR [01116], HEMOGLOBIN         [91213.000], TSH with free T4 reflex (QUEST),         Uric Acid (BFP)            (M10.071) Acute idiopathic gout of right foot  Plan: VENIPUNC FNGR,HEEL,EAR [99246], Uric Acid (BFP)            (E04.1) Nontoxic uninodular goiter  Plan: levothyroxine (SYNTHROID/LEVOTHROID) 100 MCG         tablet            (L71.9) Rosacea  Plan: metroNIDAZOLE (METROCREAM) 0.75 % external         cream            (M10.00) Idiopathic gout, unspecified chronicity, unspecified site  Plan: indomethacin (INDOCIN) 50 MG capsule              Dx:  1)  Pap smear, mammogram  2)  Lipids at appropriate intervals    PE:  Reviewed health maintenance including diet, regular exercise,   estrogen replacement and periodic exams.        2. SUBJECTIVE: 69 year old female complaining of hypothyroidism after nodule noted and surgical removal in 198[address]/ has been stable on this dose for many years  The patient describes rosacea flares better on Metrogel cream.   The patient denies a history of thyroid symptoms.   Smoking history: No.   Relevant past medical history: positive for obesity.    OBJECTIVE: The patient appears healthy, alert, no distress, cooperative, smiling and over weight.   EARS: negative  NOSE/SINUS: Nares normal. Septum midline. Mucosa normal. No drainage or  sinus tenderness.   THROAT: normal   NECK:negative findings: no asymmetry, masses, or scars, no adenopathy, positive findings: healed surgical scar without thyroid enlargement or pain   CHEST: Clear

## 2019-05-02 NOTE — NURSING NOTE
Patient is here for a full physical exam.    Pre-Visit Screening :  Immunizations : up to date  Colon Screening : is due and ordered today  Mammogram: being done later today  Asthma Action Test/Plan : NA  PHQ9 :  PHQ 2 done today  GAD7 :  NA  Patient's  BMI Body mass index is 38.35 kg/m .  Questioned patient about current smoking habits.  Pt. has never smoked.  OK to leave a detailed voice message regarding today's visit Yes, phone # [phone]      ETOH screening: addressed in history

## 2019-05-02 NOTE — PATIENT INSTRUCTIONS
Colonoscopy recommended  Exercise encouraged  Fasting lipid profile obtained  Follow up in 1 year  High fiber, low fat diet encouraged  Mammogram recommended  Monitor size and characteristics of moles  Refills given  Routine breast self-exam encouraged  Seat belt use encouraged  Sunscreen recommended  TSH obtained  Weight loss recommended

## 2019-05-02 NOTE — LETTER
May 6, 2019      Nohemi KING Darion  [address]  [facility] [address]        Dear ,    We are writing to inform you of your test results.    Your test results fall within the expected range(s) or remain unchanged from previous results. Note your uric acid ( gout) level is high and not controlled on diet alone. We should consider a medication to lower your risks of a gout attack.     Please continue with current treatment plan.    Resulted Orders   HEMOGLOBIN [56502.000]   Result Value Ref Range    Hemoglobin 14.8 11.7 - 15.7 g/dL   Glucose Fasting (BFP)   Result Value Ref Range    Glucose 100 (A) 60 - 99 mg/dL   Lipid Panel (BFP)   Result Value Ref Range    Cholesterol 175 0 - 199 mg/dL    Triglycerides 92 0 - 149 mg/dL    HDL Cholesterol 35 (A) 40 - 150 mg/dL    LDL Cholesterol Direct 122 0 - 150 mg/dL    Cholesterol/HDL Ratio 5 0 - 5   TSH with free T4 reflex ([facility])   Result Value Ref Range    TSH 1.10 0.40 - 4.50 mIU/L   URIC ACID (QUEST)   Result Value Ref Range    Uric Acid 8.7 (H) 2.5 - 7.0 mg/dL      Comment:      Therapeutic target for gout patients: <6.0 mg/dL             If you have any questions or concerns, please call the clinic at the number listed above.       Sincerely,        Sabrina Jose MD

## 2019-05-03 LAB
TSH SERPL-ACNC: 1.1 MIU/L (ref 0.4–4.5)
URATE SERPL-MCNC: 8.7 MG/DL (ref 2.5–7)

## 2020-05-04 DIAGNOSIS — E04.1 NONTOXIC UNINODULAR GOITER: ICD-10-CM

## 2020-05-04 DIAGNOSIS — E89.0 POSTSURGICAL HYPOTHYROIDISM: ICD-10-CM

## 2020-05-04 RX ORDER — LEVOTHYROXINE SODIUM 100 UG/1
TABLET ORAL
Qty: 90 TABLET | Refills: 3 | COMMUNITY
Start: 2020-05-04

## 2020-05-04 NOTE — TELEPHONE ENCOUNTER
Nohemi Orlando is requesting a refill of:    Refused Prescriptions:                       Disp   Refills    levothyroxine (SYNTHROID/LEVOTHROID) 100 M*90 tab*3        Sig: TAKE 1 TABLET(100 MCG) BY MOUTH DAILY  Refused By: STEFAN PECK  Reason for Refusal: Patient needs appointment    Pt last seen 05/02/19, due for yearly recheck.

## 2020-05-06 DIAGNOSIS — E04.1 NONTOXIC UNINODULAR GOITER: ICD-10-CM

## 2020-05-06 DIAGNOSIS — E89.0 POSTSURGICAL HYPOTHYROIDISM: ICD-10-CM

## 2020-05-06 RX ORDER — LEVOTHYROXINE SODIUM 100 UG/1
TABLET ORAL
Qty: 30 TABLET | Refills: 0 | COMMUNITY
Start: 2020-05-06 | End: 2020-06-01

## 2020-05-06 NOTE — TELEPHONE ENCOUNTER
Nohemi Orlando is requesting a refill of:    Signed Prescriptions:                        Disp   Refills    levothyroxine (SYNTHROID/LEVOTHROID) 100 M*30 tab*0        Sig: TAKE 1 TABLET(100 MCG) BY MOUTH DAILY  Authorizing Provider: CARLI SMITH  Ordering User: GLEN JOYA

## 2020-06-01 DIAGNOSIS — E04.1 NONTOXIC UNINODULAR GOITER: ICD-10-CM

## 2020-06-01 DIAGNOSIS — E89.0 POSTSURGICAL HYPOTHYROIDISM: ICD-10-CM

## 2020-06-01 RX ORDER — LEVOTHYROXINE SODIUM 100 UG/1
TABLET ORAL
Qty: 10 TABLET | Refills: 0 | COMMUNITY
Start: 2020-06-01 | End: 2020-06-10

## 2020-06-01 NOTE — TELEPHONE ENCOUNTER
Pt has appt for 06/10/20. Called her in 10 day supply of Levothyroxine 100MCG to [facility].

## 2020-06-10 ENCOUNTER — OFFICE VISIT (OUTPATIENT)
Dept: FAMILY MEDICINE | Facility: CLINIC | Age: 70
End: 2020-06-10

## 2020-06-10 VITALS
HEART RATE: 71 BPM | HEIGHT: 64 IN | TEMPERATURE: 98.2 F | WEIGHT: 227.8 LBS | BODY MASS INDEX: 38.89 KG/M2 | DIASTOLIC BLOOD PRESSURE: 84 MMHG | OXYGEN SATURATION: 98 % | SYSTOLIC BLOOD PRESSURE: 136 MMHG | RESPIRATION RATE: 18 BRPM

## 2020-06-10 DIAGNOSIS — E89.0 POSTSURGICAL HYPOTHYROIDISM: ICD-10-CM

## 2020-06-10 DIAGNOSIS — M67.432 GANGLION CYST OF WRIST, LEFT: ICD-10-CM

## 2020-06-10 DIAGNOSIS — Z86.0100 HISTORY OF COLONIC POLYPS: ICD-10-CM

## 2020-06-10 DIAGNOSIS — Z01.411 ENCOUNTER FOR GYNECOLOGICAL EXAMINATION WITH ABNORMAL FINDING: Primary | ICD-10-CM

## 2020-06-10 DIAGNOSIS — L71.9 ROSACEA: ICD-10-CM

## 2020-06-10 DIAGNOSIS — E66.01 MORBID OBESITY (H): ICD-10-CM

## 2020-06-10 DIAGNOSIS — Z13.220 SCREENING CHOLESTEROL LEVEL: ICD-10-CM

## 2020-06-10 DIAGNOSIS — Z12.11 SPECIAL SCREENING FOR MALIGNANT NEOPLASMS, COLON: ICD-10-CM

## 2020-06-10 DIAGNOSIS — Z79.899 ENCOUNTER FOR LONG-TERM (CURRENT) USE OF MEDICATIONS: ICD-10-CM

## 2020-06-10 DIAGNOSIS — M10.00 IDIOPATHIC GOUT, UNSPECIFIED CHRONICITY, UNSPECIFIED SITE: ICD-10-CM

## 2020-06-10 DIAGNOSIS — Z13.1 SCREENING FOR DIABETES MELLITUS: ICD-10-CM

## 2020-06-10 DIAGNOSIS — Z12.31 ENCOUNTER FOR SCREENING MAMMOGRAM FOR BREAST CANCER: ICD-10-CM

## 2020-06-10 LAB
CHOLEST SERPL-MCNC: 192 MG/DL (ref 0–199)
CHOLEST/HDLC SERPL: 4 {RATIO} (ref 0–5)
HBA1C MFR BLD: 5.3 % (ref 4–7)
HDLC SERPL-MCNC: 44 MG/DL (ref 40–150)
HEMOGLOBIN: 15.9 G/DL (ref 11.7–15.7)
LDLC SERPL CALC-MCNC: 119 MG/DL (ref 0–130)
TRIGL SERPL-MCNC: 144 MG/DL (ref 0–149)

## 2020-06-10 PROCEDURE — 36415 COLL VENOUS BLD VENIPUNCTURE: CPT | Performed by: FAMILY MEDICINE

## 2020-06-10 PROCEDURE — 99397 PER PM REEVAL EST PAT 65+ YR: CPT | Mod: GA | Performed by: FAMILY MEDICINE

## 2020-06-10 PROCEDURE — 84443 ASSAY THYROID STIM HORMONE: CPT | Mod: 90 | Performed by: FAMILY MEDICINE

## 2020-06-10 PROCEDURE — 84550 ASSAY OF BLOOD/URIC ACID: CPT | Performed by: FAMILY MEDICINE

## 2020-06-10 PROCEDURE — 80061 LIPID PANEL: CPT | Performed by: FAMILY MEDICINE

## 2020-06-10 PROCEDURE — 99213 OFFICE O/P EST LOW 20 MIN: CPT | Mod: 25 | Performed by: FAMILY MEDICINE

## 2020-06-10 PROCEDURE — 85018 HEMOGLOBIN: CPT | Performed by: FAMILY MEDICINE

## 2020-06-10 PROCEDURE — 83036 HEMOGLOBIN GLYCOSYLATED A1C: CPT | Performed by: FAMILY MEDICINE

## 2020-06-10 RX ORDER — LEVOTHYROXINE SODIUM 100 UG/1
TABLET ORAL
Qty: 90 TABLET | Refills: 3 | Status: SHIPPED | OUTPATIENT
Start: 2020-06-10 | End: 2021-06-07

## 2020-06-10 RX ORDER — INDOMETHACIN 50 MG/1
50 CAPSULE ORAL 3 TIMES DAILY PRN
Qty: 30 CAPSULE | Refills: 0 | Status: SHIPPED | OUTPATIENT
Start: 2020-06-10 | End: 2022-07-26

## 2020-06-10 ASSESSMENT — MIFFLIN-ST. JEOR: SCORE: 1520.35

## 2020-06-10 NOTE — PATIENT INSTRUCTIONS
Await labs    Colonoscopy recommended  Exercise encouraged  Fasting lipid profile obtained  Follow up in 1 year  Hemoglobin A1c obtained  Mammogram recommended  Refills given  Routine breast self-exam encouraged  Seat belt use encouraged  Sunscreen recommended  TSH obtained  Weight loss recommended

## 2020-06-10 NOTE — PROGRESS NOTES
Two notes/ multiple issues    1. PE  2. Hypothyroid, ganglion    1. SUBJECTIVE:  Nohemi Orlando is an 70 year old  postmenopausal woman   who presents for annual gyn exam.     She would like to refill medications understanding that is not a part of a preventative exam. waiver signed for PE on medicare.    Menopause at age 53. No   bleeding, spotting, or discharge noted.     Estrogen replacement therapy: never  CHAIM exposure: no  History of abnormal Pap smear: No  Family history of uterine or ovarian cancer: No  Regular self breast exam: Yes  History of abnormal mammogram: No  Family history of breast cancer: No  History of abnormal lipids: No    Past Medical History:  2018: Acute idiopathic gout of right foot  No date: Allergic rhinitis      Comment:   Problem list name updated by automated process.                Provider to review  No date: Arthritis      Comment:  left knee  3/6/2013: Atypical endometrial hyperplasia  No date: Chronic pain      Comment:  left knee  No date: Chronic pain of left knee  No date: Glaucoma      Comment:  dr Mohr  No date: Nontoxic uninodular goiter  2017: Obesity due to excess calories, unspecified obesity severity  No date: Open-angle glaucoma      Comment:   Problem list name updated by automated process.                Provider to review  2017: Rosacea    Review of patient's family history indicates:  Problem: Eye Disorder      Relation: Mother          Age of Onset: (Not Specified)          Comment: glaucoma  Problem: Gastrointestinal Disease      Relation: Brother          Age of Onset: (Not Specified)          Comment: crohns  Problem: Cancer - colorectal      Relation: Brother          Age of Onset: (Not Specified)          Comment: anal cancer   Problem: Genetic Disorder      Relation: Child          Age of Onset: (Not Specified)          Comment: gout      Past Surgical History:  No date: C ANESTH, SECTION  1987: C THYR LOBECTOMY+PART REMOV  "CONTRA      Comment:  right lobe, colloid adenoma, dr rivera  oct 2015: CATARACT IOL, RT/LT      Comment:  both eyes,   4/5/2013: DAVINCI HYSTERECTOMY TOTAL, BILATERAL SALPINGO-OOPHORECTOMY,   COMBINED      Comment:  Procedure: COMBINED DAVINCI HYSTERECTOMY TOTAL,                SALPINGO-OOPHORECTOMY;  DAVINCI LAPAROSCOPIC ASSISTED                TOTAL HYSTERECTOMY, BILATERAL SALPINGO OOPHORECTOMY;                 Surgeon: Manohar Peñaloza MD;  Location:  OR  2/13/2013: DILATION AND CURETTAGE, HYSTEROSCOPY DIAGNOSTIC, COMBINED      Comment:  Procedure: COMBINED DILATION AND CURETTAGE, HYSTEROSCOPY               DIAGNOSTIC;  DILATION AND CURETTAGE, HYSTEROSCOPY                DIAGNOSTIC ;  Surgeon: Patty Walker MD;  Location:                 OR  oct 2015: GLAUCOMA SURGERY      Comment:  both eyes, stents in,   1999: HC COLONOSCOPY THRU STOMA, DIAGNOSTIC      Comment:  negative   No date: ORTHOPEDIC SURGERY      Comment:  right knee scope    Current Outpatient Medications:  indomethacin (INDOCIN) 50 MG capsule  levothyroxine (SYNTHROID/LEVOTHROID) 100 MCG tablet  metroNIDAZOLE (METROCREAM) 0.75 % external cream    No current facility-administered medications for this visit.      -- No Known Allergies     Social History    Tobacco Use      Smoking status: Never Smoker      Smokeless tobacco: Never Used    Alcohol use: No      Alcohol/week: 0.0 standard drinks      Comment: maybe 1 time a year      Review Of Systems  Ears/Nose/Throat: spring allergies/ claritin  Respiratory: No shortness of breath, dyspnea on exertion, cough, or hemoptysis  Cardiovascular: negative  Gastrointestinal: colon polyps/needs colonoscopy  Genitourinary: negative  MS; once yearly gout attacks      OBJECTIVE:  /84 (BP Location: Left arm, Patient Position: Sitting, Cuff Size: Adult Large)   Pulse 71   Temp 98.2  F (36.8  C) (Oral)   Ht 1.613 m (5' 3.5\")   Wt 103.3 kg (227 lb 12.8 oz)   LMP  (LMP Unknown)   SpO2 98%   BMI " 39.72 kg/m    General appearance: healthy, alert, no distress, cooperative, smiling and over weight  Skin: Skin color, texture, turgor normal. No rashes or lesions.  Ears: negative  Nose/Sinuses: Nares normal. Septum midline. Mucosa normal. No drainage or sinus tenderness.  Oropharynx: Lips, mucosa, and tongue normal. Teeth and gums normal.  Neck: Neck supple. No adenopathy. Thyroid symmetric, normal size,, Carotids without bruits.  Lungs: negative, Percussion normal. Good diaphragmatic excursion. Lungs clear  Heart: negative, PMI normal. No lifts, heaves, or thrills. RRR. No murmurs, clicks gallops or rub  Breasts: Inspection negative. No nipple discharge or bleeding. No masses.  Abdomen: Abdomen soft, non-tender. BS normal. No masses, organomegaly, positive findings: obese  Pelvic: Deferred  BMI : Body mass index is 39.72 kg/m .      ASSESSMENT: (Z01.411) Encounter for gynecological examination with abnormal finding  (primary encounter diagnosis)  Plan: CL AFF HEMOGLOBIN (BFP), VENOUS COLLECTION        Await labs    Colonoscopy recommended  Exercise encouraged  Fasting lipid profile obtained  Follow up in 1 year  Hemoglobin A1c obtained  Mammogram recommended  Refills given  Routine breast self-exam encouraged  Seat belt use encouraged  Sunscreen recommended  TSH obtained  Weight loss recommended      (E89.0) Postsurgical hypothyroidism  Plan: TSH with free T4 reflex ([facility]), VENOUS         COLLECTION, levothyroxine         (SYNTHROID/LEVOTHROID) 100 MCG tablet,         OFFICE/OUTPT VISIT,EST,LEVL III            (E66.01) Morbid obesity (H)  Plan: Hemoglobin A1c (BFP), Lipid Panel (BFP), VENOUS        COLLECTION        A low fat diet, regular aerobic exercise like walking 30 minutes daily and weight control is the treatment recommendations at this time      (M10.00) Idiopathic gout, unspecified chronicity, unspecified site  Plan: URIC ACID ([facility]), VENOUS COLLECTION,         indomethacin (INDOCIN) 50 MG capsule,          OFFICE/OUTPT VISIT,EST,LEVL III        Diet reviewed    (L71.9) Rosacea  Plan: metroNIDAZOLE (METROCREAM) 0.75 % external         cream, OFFICE/OUTPT VISIT,EST,LEVL III        Gentle skin care    (M67.432) Ganglion cyst of wrist, left  Plan: OFFICE/OUTPT VISIT,EST,LEVL III        Schedule visit prn after monitoring for resolution    (Z86.010) History of colonic polyps  Plan: GASTROENTEROLOGY ADULT REF PROCEDURE ONLY        encouraged    (Z12.31) Encounter for screening mammogram for breast cancer  Plan: Mammo Screening digital (bilat)        encouraged    (Z12.11) Special screening for malignant neoplasms, colon  Plan: GASTROENTEROLOGY ADULT REF PROCEDURE ONLY        I have reviewed the patient's medical history in detail and updated the computerized patient record.      (Z79.899) Encounter for long-term (current) use of medications  Plan: CL AFF HEMOGLOBIN (BFP), TSH with free T4         reflex ([facility]), URIC ACID ([facility]), VENOUS         COLLECTION            (Z13.1) Screening for diabetes mellitus  Plan: Hemoglobin A1c (BFP), VENOUS COLLECTION            (Z13.220) Screening cholesterol level  Plan: Lipid Panel (BFP), VENOUS COLLECTION                Dx:  1)  Pap smear, mammogram  2)  Lipids at appropriate interva  PE:  Reviewed health maintenance including diet, regular exercise,   estrogen replacement and periodic exams.      2. SUBJECTIVE: 70 year old female complaining of left wrist painless bump, gout attacks 1-2 times a year usually in the spring and hypothyroidism. She has noticed the wrist change for 6 month(s).   The patient describes gout often after eating easter candy. Hypothyroid after thyroid nodule removal in 1987 .   The patient denies a history of thyroid symptoms, wrist injury.   Smoking history: No.   Relevant past medical history: positive for morbid obesity.    OBJECTIVE: The patient appears healthy, alert, no distress, cooperative, over weight and fatigued.   EARS: negative  NOSE/SINUS:  Nares normal. Septum midline. Mucosa normal. No drainage or sinus tenderness.   THROAT: normal   NECK:Neck supple. No adenopathy. Thyroid symmetric, normal size,, Carotids without bruits.   CHEST: Clear  EXT: left flexor surface at the radial aspect 2 cm ganglion/ The hands reveal normal motor, sensory, vascular and tendon function.

## 2020-06-10 NOTE — NURSING NOTE
Patient is here for a full physical exam.    Pre-Visit Screening :  Immunizations : not up to date - will get tetanus at pharmacy  Colon Screening : ordered  Mammogram: ordered  Asthma Action Test/Plan : NA  PHQ9 :  PHQ 2 done today  GAD7 :  NA  Patient's  BMI Body mass index is 39.72 kg/m .  Questioned patient about current smoking habits.  Pt. has never smoked.  OK to leave a detailed voice message regarding today's visit Yes, phone # [phone]      ETOH screening: addressed in history

## 2020-06-10 NOTE — LETTER
June 11, 2020      Nohemi KING Darion  [address]  [facility] [address]        Dear ,    We are writing to inform you of your test results.    Normal thyroid, diabetes screen and cholesterol.  Your uric acid(gout) is high. I would recommend a daily medication to lower this value and your risk of gout pain and arthritis. Schedule a visit.    Resulted Orders   CL AFF HEMOGLOBIN (BFP)   Result Value Ref Range    Hemoglobin 15.9 (A) 11.7 - 15.7 g/dL   TSH with free T4 reflex ([facility])   Result Value Ref Range    TSH 0.69 0.40 - 4.50 mIU/L   URIC ACID ([facility])   Result Value Ref Range    Uric Acid 8.7 (H) 2.5 - 7.0 mg/dL      Comment:      Therapeutic target for gout patients: <6.0 mg/dL         Hemoglobin A1c (BFP)   Result Value Ref Range    Hemoglobin A1C 5.3 4.0 - 7.0 %   Lipid Panel (BFP)   Result Value Ref Range    Cholesterol 192 0 - 199 mg/dL    Triglycerides 144 0 - 149 mg/dL    HDL Cholesterol 44 40 - 150 mg/dL    LDL Cholesterol Direct 119 0 - 130 mg/dL    Cholesterol/HDL Ratio 4 0 - 5       If you have any questions or concerns, please call the clinic at the number listed above.       Sincerely,        Sabrina Jose MD

## 2020-06-11 LAB
TSH SERPL-ACNC: 0.69 MIU/L (ref 0.4–4.5)
URATE SERPL-MCNC: 8.7 MG/DL (ref 2.5–7)

## 2021-03-12 ENCOUNTER — IMMUNIZATION (OUTPATIENT)
Dept: NURSING | Facility: CLINIC | Age: 71
End: 2021-03-12
Payer: MEDICARE

## 2021-03-12 PROCEDURE — 91300 PR COVID VAC PFIZER DIL RECON 30 MCG/0.3 ML IM: CPT

## 2021-03-12 PROCEDURE — 0001A PR COVID VAC PFIZER DIL RECON 30 MCG/0.3 ML IM: CPT

## 2021-04-02 ENCOUNTER — IMMUNIZATION (OUTPATIENT)
Dept: NURSING | Facility: CLINIC | Age: 71
End: 2021-04-02
Attending: INTERNAL MEDICINE
Payer: MEDICARE

## 2021-04-02 PROCEDURE — 0002A PR COVID VAC PFIZER DIL RECON 30 MCG/0.3 ML IM: CPT

## 2021-04-02 PROCEDURE — 91300 PR COVID VAC PFIZER DIL RECON 30 MCG/0.3 ML IM: CPT

## 2021-06-07 DIAGNOSIS — E89.0 POSTSURGICAL HYPOTHYROIDISM: ICD-10-CM

## 2021-06-07 RX ORDER — LEVOTHYROXINE SODIUM 100 UG/1
TABLET ORAL
Qty: 23 TABLET | Refills: 0 | COMMUNITY
Start: 2021-06-07 | End: 2021-06-30

## 2021-06-07 NOTE — TELEPHONE ENCOUNTER
Last Ov 6/10/2020. Transferred pt to the front. Pt has appt 6/30/2021. Called in short supply.     Signed Prescriptions:                        Disp   Refills    levothyroxine (SYNTHROID/LEVOTHROID) 100 M*23 tab*0        Sig: TAKE 1 TABLET BY MOUTH EVERY DAY  Authorizing Provider: CARLI SMITH  Ordering User: NEVA TRISTAN

## 2021-06-30 ENCOUNTER — OFFICE VISIT (OUTPATIENT)
Dept: FAMILY MEDICINE | Facility: CLINIC | Age: 71
End: 2021-06-30

## 2021-06-30 VITALS
BODY MASS INDEX: 37.22 KG/M2 | WEIGHT: 218 LBS | HEART RATE: 58 BPM | RESPIRATION RATE: 20 BRPM | HEIGHT: 64 IN | TEMPERATURE: 97.4 F | SYSTOLIC BLOOD PRESSURE: 132 MMHG | OXYGEN SATURATION: 96 % | DIASTOLIC BLOOD PRESSURE: 82 MMHG

## 2021-06-30 DIAGNOSIS — E66.01 MORBID OBESITY (H): ICD-10-CM

## 2021-06-30 DIAGNOSIS — Z01.411 ENCOUNTER FOR GYNECOLOGICAL EXAMINATION WITH ABNORMAL FINDING: Primary | ICD-10-CM

## 2021-06-30 DIAGNOSIS — E89.0 POSTSURGICAL HYPOTHYROIDISM: ICD-10-CM

## 2021-06-30 DIAGNOSIS — Z12.31 ENCOUNTER FOR SCREENING MAMMOGRAM FOR BREAST CANCER: ICD-10-CM

## 2021-06-30 DIAGNOSIS — Z13.220 SCREENING CHOLESTEROL LEVEL: ICD-10-CM

## 2021-06-30 DIAGNOSIS — L71.9 ROSACEA: ICD-10-CM

## 2021-06-30 DIAGNOSIS — M10.00 IDIOPATHIC GOUT, UNSPECIFIED CHRONICITY, UNSPECIFIED SITE: ICD-10-CM

## 2021-06-30 DIAGNOSIS — Z13.820 SCREENING FOR OSTEOPOROSIS: ICD-10-CM

## 2021-06-30 DIAGNOSIS — Z13.1 SCREENING FOR DIABETES MELLITUS: ICD-10-CM

## 2021-06-30 DIAGNOSIS — Z79.899 ENCOUNTER FOR LONG-TERM (CURRENT) USE OF MEDICATIONS: ICD-10-CM

## 2021-06-30 LAB
CHOLEST SERPL-MCNC: 201 MG/DL (ref 0–199)
CHOLEST/HDLC SERPL: 5 {RATIO} (ref 0–5)
GLUCOSE SERPL-MCNC: 86 MG/DL (ref 60–99)
HDLC SERPL-MCNC: 43 MG/DL (ref 40–150)
HEMOGLOBIN: 16.1 G/DL (ref 11.7–15.7)
LDLC SERPL CALC-MCNC: 129 MG/DL (ref 0–130)
TRIGL SERPL-MCNC: 144 MG/DL (ref 0–149)

## 2021-06-30 PROCEDURE — 80061 LIPID PANEL: CPT | Performed by: FAMILY MEDICINE

## 2021-06-30 PROCEDURE — 36415 COLL VENOUS BLD VENIPUNCTURE: CPT | Performed by: FAMILY MEDICINE

## 2021-06-30 PROCEDURE — 99397 PER PM REEVAL EST PAT 65+ YR: CPT | Mod: GA | Performed by: FAMILY MEDICINE

## 2021-06-30 PROCEDURE — 85018 HEMOGLOBIN: CPT | Performed by: FAMILY MEDICINE

## 2021-06-30 PROCEDURE — 82947 ASSAY GLUCOSE BLOOD QUANT: CPT | Performed by: FAMILY MEDICINE

## 2021-06-30 RX ORDER — LEVOTHYROXINE SODIUM 100 UG/1
TABLET ORAL
Qty: 90 TABLET | Refills: 3 | Status: SHIPPED | OUTPATIENT
Start: 2021-06-30 | End: 2022-06-22

## 2021-06-30 ASSESSMENT — MIFFLIN-ST. JEOR: SCORE: 1470.9

## 2021-06-30 NOTE — PROGRESS NOTES
Waiver signed    1. PE  2. Hypothryoid medication/rosacea/gout    1. SUBJECTIVE:  Nohemi Orlando is an 71 year old  postmenopausal woman   who presents for annual gyn exam. She unsderstands her medication refills is not under a PE.    Menopause at age 53. No   bleeding, spotting, or discharge noted.     Estrogen replacement therapy: never  CHAIM exposure: no  History of abnormal Pap smear: No  Family history of uterine or ovarian cancer: No  Regular self breast exam: Yes  History of abnormal mammogram: No  Family history of breast cancer: No  History of abnormal lipids: No    Past Medical History:  2018: Acute idiopathic gout of right foot  No date: Allergic rhinitis      Comment:   Problem list name updated by automated process.                Provider to review  No date: Arthritis      Comment:  left knee  3/6/2013: Atypical endometrial hyperplasia  No date: Chronic pain      Comment:  left knee  No date: Chronic pain of left knee  No date: Glaucoma      Comment:  dr Mohr  No date: Nontoxic uninodular goiter  2017: Obesity due to excess calories, unspecified obesity severity  No date: Open-angle glaucoma      Comment:   Problem list name updated by automated process.                Provider to review  2017: Rosacea    Review of patient's family history indicates:  Problem: Eye Disorder      Relation: Mother          Age of Onset: (Not Specified)          Comment: glaucoma  Problem: Gastrointestinal Disease      Relation: Brother          Age of Onset: (Not Specified)          Comment: crohns  Problem: Cancer - colorectal      Relation: Brother          Age of Onset: (Not Specified)          Comment: anal cancer   Problem: Genetic Disorder      Relation: Child          Age of Onset: (Not Specified)          Comment: gout      Past Surgical History:  No date: C ANESTH, SECTION  1987: C THYR LOBECTOMY+PART REMOV CONTRA      Comment:  right lobe, colloid adenoma, dr rivera  oct 2015:  "CATARACT IOL, RT/LT      Comment:  both eyes,   4/5/2013: DAVINCI HYSTERECTOMY TOTAL, BILATERAL SALPINGO-OOPHORECTOMY,   COMBINED      Comment:  Procedure: COMBINED DAVINCI HYSTERECTOMY TOTAL,                SALPINGO-OOPHORECTOMY;  DAVINCI LAPAROSCOPIC ASSISTED                TOTAL HYSTERECTOMY, BILATERAL SALPINGO OOPHORECTOMY;                 Surgeon: Manohar Peñaloza MD;  Location:  OR  2/13/2013: DILATION AND CURETTAGE, HYSTEROSCOPY DIAGNOSTIC, COMBINED      Comment:  Procedure: COMBINED DILATION AND CURETTAGE, HYSTEROSCOPY               DIAGNOSTIC;  DILATION AND CURETTAGE, HYSTEROSCOPY                DIAGNOSTIC ;  Surgeon: Patty Walker MD;  Location:                 OR  oct 2015: GLAUCOMA SURGERY      Comment:  both eyes, stents in,   1999: HC COLONOSCOPY THRU STOMA, DIAGNOSTIC      Comment:  negative   No date: ORTHOPEDIC SURGERY      Comment:  right knee scope    Current Outpatient Medications:  levothyroxine (SYNTHROID/LEVOTHROID) 100 MCG tablet  indomethacin (INDOCIN) 50 MG capsule  metroNIDAZOLE (METROCREAM) 0.75 % external cream    No current facility-administered medications for this visit.      -- No Known Allergies     Social History    Tobacco Use      Smoking status: Never Smoker      Smokeless tobacco: Never Used    Alcohol use: No      Alcohol/week: 0.0 standard drinks      Comment: maybe 1 time a year      Review Of Systems  Ears/Nose/Throat: negative  Respiratory: No shortness of breath, dyspnea on exertion, cough, or hemoptysis  Cardiovascular: negative  Gastrointestinal: negative  Genitourinary: negative  MS; gout/diet controlled    OBJECTIVE:  /82 (BP Location: Left arm, Patient Position: Sitting, Cuff Size: Adult Large)   Pulse 58   Temp 97.4  F (36.3  C) (Temporal)   Resp 20   Ht 1.613 m (5' 3.5\")   Wt 98.9 kg (218 lb)   LMP  (LMP Unknown)   SpO2 96%   BMI 38.01 kg/m    General appearance: healthy, alert and no distress  Skin: Skin color, texture, turgor normal. No " rashes or lesions.  Ears: negative  Nose/Sinuses: Nares normal. Septum midline. Mucosa normal. No drainage or sinus tenderness.  Oropharynx: Lips, mucosa, and tongue normal. Teeth and gums normal.  Neck: Neck supple. No adenopathy. Thyroid symmetric, normal size,, Carotids without bruits.  Lungs: negative, Percussion normal. Good diaphragmatic excursion. Lungs clear  Heart: negative, PMI normal. No lifts, heaves, or thrills. RRR. No murmurs, clicks gallops or rub  Breasts: Inspection negative. No nipple discharge or bleeding. No masses.  Abdomen: Abdomen soft, non-tender. BS normal. No masses, organomegaly, positive findings: obese  Pelvic: External genitalia and vagina normal. Bimanual and rectovaginal normal., positive findings:  vaginal mucosa atrophy  Ext: The lower extremities are normal and reveal no sign of DVT. Calves and thighs are soft and non tender, color is normal, no swelling or redness. Hansa's sign is negative.  Pedal pulses are normal.  BMI : Body mass index is 38.01 kg/m .    ASSESSMENT:  (Z01.411) Encounter for gynecological examination with abnormal finding  (primary encounter diagnosis)  Plan: HEMOGLOBIN (BFP), VENOUS COLLECTION        Exercise encouraged  Fasting lipid profile obtained  Flu shot recommended  Follow up in 1 year  Glucose obtained  Hemoglobin obtained  High fiber, low fat diet encouraged  Refills given  Routine breast self-exam encouraged  Seat belt use encouraged  Sunscreen recommended  Weight loss recommended      (E89.0) Postsurgical hypothyroidism  Comment: await labs  Plan: VENOUS COLLECTION, TSH WITH FREE T4 REFLEX         ([facility]), levothyroxine (SYNTHROID/LEVOTHROID)         100 MCG tablet        refilled    (Z13.820) Screening for osteoporosis  Plan: Dexa hip/pelvis/spine*, RADIOLOGY REFERRAL        Check coverage    (M10.00) Idiopathic gout, unspecified chronicity, unspecified site  Plan: VENOUS COLLECTION, URIC ACID ([facility])            (L71.9) Rosacea  Plan:  metroNIDAZOLE (METROCREAM) 0.75 % external         cream            (E66.01) Obesity (BMI 35.0-39.9) with comorbidity (H)  Plan: A low fat diet, regular aerobic exercise like walking 30 minutes daily and weight control is the treatment recommendations at this time      (Z12.31) Encounter for screening mammogram for breast cancer  Plan: Mammo Screening digital (bilat), RADIOLOGY         REFERRAL            (Z13.1) Screening for diabetes mellitus  Plan: Glucose Fasting (BFP), VENOUS COLLECTION            (Z13.220) Screening cholesterol level  Plan: Lipid Panel (BFP), VENOUS COLLECTION            (Z79.899) Encounter for long-term (current) use of medications  Plan: HEMOGLOBIN (BFP), VENOUS COLLECTION, TSH WITH         FREE T4 REFLEX ([facility]), URIC ACID ([facility])              Dx:  1)  Pap smear, mammogram  2)  Lipids at appropriate intervals    PE:  Reviewed health maintenance including diet, regular exercise,   estrogen replacement and periodic exams.

## 2021-06-30 NOTE — PATIENT INSTRUCTIONS
Await labs     Exercise encouraged  Fasting lipid profile obtained  Flu shot recommended  Follow up in 1 year  Glucose obtained  Hemoglobin obtained  High fiber, low fat diet encouraged  Refills given  Routine breast self-exam encouraged  Seat belt use encouraged  Sunscreen recommended  Weight loss recommended

## 2021-07-01 LAB
TSH SERPL-ACNC: 0.74 MIU/L (ref 0.4–4.5)
URATE SERPL-MCNC: 8.9 MG/DL (ref 2.5–7)

## 2021-10-23 ENCOUNTER — HEALTH MAINTENANCE LETTER (OUTPATIENT)
Age: 71
End: 2021-10-23

## 2022-06-20 DIAGNOSIS — E89.0 POSTSURGICAL HYPOTHYROIDISM: ICD-10-CM

## 2022-06-22 NOTE — TELEPHONE ENCOUNTER
Pt scheduled appt for 07/26. Needs short supply of her levothyroxine.    Nohemi Orlando is requesting a refill of:    Pending Prescriptions:                       Disp   Refills    levothyroxine (SYNTHROID/LEVOTHROID) 100 *45 tab*0            Sig: TAKE 1 TABLET BY MOUTH EVERY DAY

## 2022-06-23 DIAGNOSIS — E89.0 POSTSURGICAL HYPOTHYROIDISM: ICD-10-CM

## 2022-06-23 RX ORDER — LEVOTHYROXINE SODIUM 100 UG/1
TABLET ORAL
Qty: 90 TABLET | COMMUNITY
Start: 2022-06-23

## 2022-06-23 RX ORDER — LEVOTHYROXINE SODIUM 100 UG/1
TABLET ORAL
Qty: 45 TABLET | Refills: 0 | Status: SHIPPED | OUTPATIENT
Start: 2022-06-23 | End: 2022-07-26

## 2022-06-24 NOTE — TELEPHONE ENCOUNTER
Received incoming refill request for  Pending Prescriptions:                       Disp   Refills    levothyroxine (SYNTHROID/LEVOTHROID) 100 *90 tab*             Sig: TAKE 1 TABLET BY MOUTH EVERY DAY    Patient last had a refill of this medication today. This is being denied-pt due for OV and was only approved for a short supply as she was last seen a year ago.

## 2022-07-08 ENCOUNTER — OFFICE VISIT (OUTPATIENT)
Dept: FAMILY MEDICINE | Facility: CLINIC | Age: 72
End: 2022-07-08

## 2022-07-08 VITALS
HEART RATE: 72 BPM | RESPIRATION RATE: 24 BRPM | BODY MASS INDEX: 38.71 KG/M2 | TEMPERATURE: 98 F | DIASTOLIC BLOOD PRESSURE: 78 MMHG | WEIGHT: 222 LBS | OXYGEN SATURATION: 96 % | SYSTOLIC BLOOD PRESSURE: 138 MMHG
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
## 2022-07-08 DIAGNOSIS — S90.465A TICK BITE OF LESSER TOE OF LEFT FOOT, INITIAL ENCOUNTER: Primary | ICD-10-CM

## 2022-07-08 PROCEDURE — 99213 OFFICE O/P EST LOW 20 MIN: CPT | Performed by: PHYSICIAN ASSISTANT

## 2022-07-08 RX ORDER — DOXYCYCLINE 100 MG/1
200 TABLET ORAL DAILY
Qty: 2 TABLET | Refills: 0 | Status: SHIPPED | OUTPATIENT
Start: 2022-07-08 | End: 2022-07-09

## 2022-07-08 NOTE — PROGRESS NOTES
Assessment & Plan     Tick bite of lesser toe of left foot, initial encounter-patient would feel safer with antibiotic prophylaxis, given risk vs benefit greatly favoring benefit of decreasing chance of Lyme disease (however small), will initiate prophylaxis  Use topical hydrocortisone on area  - doxycycline monohydrate (ADOXA) 100 MG tablet  Dispense: 2 tablet; Refill: 0      Follow up as needed or if symptoms worsen or change    No follow-ups on file.    Gonzalo Palacio, PA-C  [facility]    Subjective   Nohemi is a 72 year old, presenting for the following health issues:  Tick Bite (Tick bite on left foot in between two last toes, did take the tick out, now sees some redness in the area, not sure if there is more of the tick still inside)      HPI     Bite occurred 10 days ago-on left 5th toe-found tick between 4th and 5th toe. Tick had been attached under 24hrs, was brought into house by son and dog. Tick was size of wood tick-patient is pretty sure that it was a wood tick. Very dark tick. Slight swelling and redness with the area. No fevers/chills, feel well. Swelling and redness developed just today in the area of the bite.    Review of Systems   Constitutional, HEENT, cardiovascular, pulmonary, gi and gu systems are negative, except as otherwise noted.      Objective    /78 (BP Location: Right arm, Patient Position: Sitting, Cuff Size: Adult Large)   Pulse 72   Temp 98  F (36.7  C) (Temporal)   Resp 24   Wt 100.7 kg (222 lb)   LMP  (LMP Unknown)   SpO2 96%   BMI 38.71 kg/m    Body mass index is 38.71 kg/m .  Physical Exam   GENERAL: healthy, alert and no distress  NECK: no adenopathy, no asymmetry, masses, or scars and thyroid normal to palpation  RESP: lungs clear to auscultation - no rales, rhonchi or wheezes  CV: regular rate and rhythm, normal S1 S2, no S3 or S4, no murmur, click or rub, no peripheral edema and peripheral pulses strong  ABDOMEN: soft, nontender, no  hepatosplenomegaly, no masses and bowel sounds normal  MS: L 4th and 5th toe: 1.5cm lesion around bite with slight erythema, no pus or discharge noted  SKIN: no suspicious lesions or rashes  NEURO: Normal strength and tone, mentation intact and speech normal                    .  ..

## 2022-07-08 NOTE — NURSING NOTE
Chief Complaint   Patient presents with     Tick Bite     Tick bite on left foot in between two last toes, did take the tick out, now sees some redness in the area, not sure if there is more of the tick still inside     Pre-visit Screening:  Immunizations:  up to date  Colonoscopy:  is up to date  Mammogram: is due and to be scheduled by patient for later completion  Asthma Action Test/Plan:  NA  PHQ9:  PHQ-2 done today   GAD7:  No concerns  Questioned patient about current smoking habits Pt. has never smoked.  Ok to leave detailed message on voice mail for today's visit only Yes, phone # [phone]

## 2022-07-22 PROBLEM — E66.09 OBESITY DUE TO EXCESS CALORIES, UNSPECIFIED OBESITY SEVERITY: Status: RESOLVED | Noted: 2017-02-02 | Resolved: 2022-07-22

## 2022-07-22 NOTE — PROGRESS NOTES
SUBJECTIVE:   CC: Nohemi Orlando is an 72 year old woman who presents for preventive health visit.       Patient has been advised of split billing requirements and indicates understanding: Yes  HPI    Here for a physical.  She is doing well.  Due for labs and refills.  Thyroid: no problems or concerns, no side effects. Due for refills.  Indomethacin: uses about once a year for gout. Otherwise sx are pretty well controlled.  Metronidazole cream for rosacea, uses as needed. Well controlled in the summer.        Today's PHQ-2 Score:   PHQ-2 ( 1999 Pfizer) 7/8/2022   Q1: Little interest or pleasure in doing things 0   Q2: Feeling down, depressed or hopeless 0   PHQ-2 Score 0   PHQ-2 Total Score (12-17 Years)- Positive if 3 or more points; Administer PHQ-A if positive -         Do you feel safe in your environment? Yes    Have you ever done Advance Care Planning? (For example, a Health Directive, POLST, or a discussion with a medical provider or your loved ones about your wishes): No, advance care planning information given to patient to review.  Patient plans to discuss their wishes with loved ones or provider.      Social History     Tobacco Use     Smoking status: Never Smoker     Smokeless tobacco: Never Used   Substance Use Topics     Alcohol use: No     Alcohol/week: 0.0 standard drinks     Comment: maybe 1 time a year     No concerns.    No flowsheet data found.    Reviewed orders with patient.  Reviewed health maintenance and updated orders accordingly - Yes  BP Readings from Last 3 Encounters:   07/26/22 124/72   07/08/22 138/78   06/30/21 132/82    Wt Readings from Last 3 Encounters:   07/26/22 99.2 kg (218 lb 9.6 oz)   07/08/22 100.7 kg (222 lb)   06/30/21 98.9 kg (218 lb)                  Patient Active Problem List   Diagnosis     Allergic rhinitis     Open-angle glaucoma     ACP (advance care planning)     Nontoxic uninodular goiter     Health Care Home     Primary localized osteoarthrosis, lower leg      Rosacea     Acute idiopathic gout of right foot     Obesity (BMI 35.0-39.9) with comorbidity (H)     History of colonic polyps     Past Surgical History:   Procedure Laterality Date     C ANESTH, SECTION       CATARACT IOL, RT/LT  oct 2015    both eyes,      DAVINCI HYSTERECTOMY TOTAL, BILATERAL SALPINGO-OOPHORECTOMY, COMBINED  2013    Procedure: COMBINED DAVINCI HYSTERECTOMY TOTAL, SALPINGO-OOPHORECTOMY;  DAVINCI LAPAROSCOPIC ASSISTED TOTAL HYSTERECTOMY, BILATERAL SALPINGO OOPHORECTOMY;  Surgeon: Manohar Peñaloza MD;  Location: [facility]     DILATION AND CURETTAGE, HYSTEROSCOPY DIAGNOSTIC, COMBINED  2013    Procedure: COMBINED DILATION AND CURETTAGE, HYSTEROSCOPY DIAGNOSTIC;  DILATION AND CURETTAGE, HYSTEROSCOPY DIAGNOSTIC ;  Surgeon: Patty Walker MD;  Location: [facility] OR     GLAUCOMA SURGERY  oct 2015    both eyes, stents in,      ORTHOPEDIC SURGERY      right knee scope     ZZC THYR LOBECTOMY+PART REMOV CONTRA      right lobe, colloid adenoma, dr rivera     ZZ COLONOSCOPY THRU STOMA, DIAGNOSTIC      negative        Social History     Tobacco Use     Smoking status: Never Smoker     Smokeless tobacco: Never Used   Substance Use Topics     Alcohol use: No     Alcohol/week: 0.0 standard drinks     Comment: maybe 1 time a year     Family History   Problem Relation Age of Onset     Eye Disorder Mother         glaucoma     Gastrointestinal Disease Brother         crohns     Cancer - colorectal Brother         anal cancer      Genetic Disorder Child         gout         Current Outpatient Medications   Medication Sig Dispense Refill     indomethacin (INDOCIN) 50 MG capsule Take 1 capsule (50 mg) by mouth 3 times daily as needed for other (gout) 30 capsule 0     levothyroxine (SYNTHROID/LEVOTHROID) 100 MCG tablet TAKE 1 TABLET BY MOUTH EVERY DAY 90 tablet 3     metroNIDAZOLE (METROCREAM) 0.75 % external cream APPLY EXTERNALLY TO THE AFFECTED AREA TWICE DAILY 45 g 1       Breast Cancer  Screening:        History of abnormal Pap smear: hysterectomy     Reviewed and updated as needed this visit by clinical staff   Tobacco  Allergies                 Reviewed and updated as needed this visit by Provider                   Past Medical History:   Diagnosis Date     Acute idiopathic gout of right foot 2018     Allergic rhinitis      Problem list name updated by automated process. Provider to review     Arthritis     left knee     Atypical endometrial hyperplasia 3/6/2013     Chronic pain     left knee     Chronic pain of left knee      Glaucoma     dr Mohr     Nontoxic uninodular goiter      Obesity due to excess calories, unspecified obesity severity 2017     Open-angle glaucoma      Problem list name updated by automated process. Provider to review     Rosacea 2017      Past Surgical History:   Procedure Laterality Date     C ANESTH, SECTION       CATARACT IOL, RT/LT  oct 2015    both eyes,      DAVINCI HYSTERECTOMY TOTAL, BILATERAL SALPINGO-OOPHORECTOMY, COMBINED  2013    Procedure: COMBINED DAVINCI HYSTERECTOMY TOTAL, SALPINGO-OOPHORECTOMY;  DAVINCI LAPAROSCOPIC ASSISTED TOTAL HYSTERECTOMY, BILATERAL SALPINGO OOPHORECTOMY;  Surgeon: Manohar Peñaloza MD;  Location: [facility]     DILATION AND CURETTAGE, HYSTEROSCOPY DIAGNOSTIC, COMBINED  2013    Procedure: COMBINED DILATION AND CURETTAGE, HYSTEROSCOPY DIAGNOSTIC;  DILATION AND CURETTAGE, HYSTEROSCOPY DIAGNOSTIC ;  Surgeon: Patty Walker MD;  Location: [facility] OR     GLAUCOMA SURGERY  oct 2015    both eyes, stents in,      ORTHOPEDIC SURGERY      right knee scope     [facility] THYR LOBECTOMY+PART REMOV CONTRA      right lobe, colloid adenoma, dr rivera     [facility] COLONOSCOPY THRU STOMA, DIAGNOSTIC      negative        Review of Systems  CONSTITUTIONAL: NEGATIVE for fever, chills, change in weight  INTEGUMENTARY/SKIN: NEGATIVE for worrisome rashes, moles or lesions  EYES: NEGATIVE for vision changes or irritation  ENT:  "NEGATIVE for ear, mouth and throat problems  RESP: NEGATIVE for significant cough or SOB  BREAST: NEGATIVE for masses, tenderness or discharge  CV: NEGATIVE for chest pain, palpitations or peripheral edema  GI: NEGATIVE for nausea, abdominal pain, heartburn, or change in bowel habits  : NEGATIVE for unusual urinary or vaginal symptoms. No vaginal bleeding.  MUSCULOSKELETAL: NEGATIVE for significant arthralgias or myalgia  NEURO: NEGATIVE for weakness, dizziness or paresthesias  PSYCHIATRIC: NEGATIVE for changes in mood or affect      OBJECTIVE:   /72 (BP Location: Right arm, Patient Position: Sitting, Cuff Size: Adult Large)   Pulse 64   Temp 98.2  F (36.8  C) (Temporal)   Ht 1.626 m (5' 4\")   Wt 99.2 kg (218 lb 9.6 oz)   LMP  (LMP Unknown)   SpO2 97%   BMI 37.52 kg/m    Physical Exam  GENERAL: healthy, alert and no distress  EYES: Eyes grossly normal to inspection, PERRL and conjunctivae and sclerae normal  HENT: ear canals and TM's normal, nose and mouth without ulcers or lesions  NECK: no adenopathy, no asymmetry, masses, or scars and thyroid normal to palpation  RESP: lungs clear to auscultation - no rales, rhonchi or wheezes  BREAST: normal without masses, tenderness or nipple discharge and no palpable axillary masses or adenopathy  CV: regular rate and rhythm, normal S1 S2, no S3 or S4, no murmur, click or rub, no peripheral edema and peripheral pulses strong  ABDOMEN: soft, nontender, no hepatosplenomegaly, no masses and bowel sounds normal  MS: no gross musculoskeletal defects noted, no edema  SKIN: no suspicious lesions or rashes  NEURO: Normal strength and tone, mentation intact and speech normal  PSYCH: mentation appears normal, affect normal/bright  Pelvic: declined     Diagnostic Test Results:  Labs reviewed in Epic  No results found for any visits on 07/26/22.    ASSESSMENT/PLAN:   1. Encounter for routine history and physical exam in female patient    - VENOUS COLLECTION  - Lipid Panel " "(BFP)  - Basic Metabolic Panel (BFP)    2. Postsurgical hypothyroidism  Check labs, refilled medications.  - levothyroxine (SYNTHROID/LEVOTHROID) 100 MCG tablet; TAKE 1 TABLET BY MOUTH EVERY DAY  Dispense: 90 tablet; Refill: 3  - TSH WITH FREE T4 REFLEX ([facility])    3. Idiopathic gout, unspecified chronicity, unspecified site  Controlled, uses indocin as needed.  - indomethacin (INDOCIN) 50 MG capsule; Take 1 capsule (50 mg) by mouth 3 times daily as needed for other (gout)  Dispense: 30 capsule; Refill: 0  - URIC ACID ([facility])    4. Rosacea  Use cream as needed.  - metroNIDAZOLE (METROCREAM) 0.75 % external cream; APPLY EXTERNALLY TO THE AFFECTED AREA TWICE DAILY  Dispense: 45 g; Refill: 1    5. Encounter for screening mammogram for breast cancer    - Mammo Screening digital (bilat)  - Radiology Referral      COUNSELING:  Reviewed preventive health counseling, as reflected in patient instructions       Regular exercise       Healthy diet/nutrition    Estimated body mass index is 37.52 kg/m  as calculated from the following:    Height as of this encounter: 1.626 m (5' 4\").    Weight as of this encounter: 99.2 kg (218 lb 9.6 oz).    Weight management plan: Discussed healthy diet and exercise guidelines    She reports that she has never smoked. She has never used smokeless tobacco.        Francy Sheehan MD  [facility]  "

## 2022-07-26 ENCOUNTER — OFFICE VISIT (OUTPATIENT)
Dept: FAMILY MEDICINE | Facility: CLINIC | Age: 72
End: 2022-07-26

## 2022-07-26 VITALS
BODY MASS INDEX: 37.32 KG/M2 | DIASTOLIC BLOOD PRESSURE: 72 MMHG | TEMPERATURE: 98.2 F | SYSTOLIC BLOOD PRESSURE: 124 MMHG | WEIGHT: 218.6 LBS | OXYGEN SATURATION: 97 % | HEIGHT: 64 IN | HEART RATE: 64 BPM

## 2022-07-26 DIAGNOSIS — Z12.31 ENCOUNTER FOR SCREENING MAMMOGRAM FOR BREAST CANCER: Primary | ICD-10-CM

## 2022-07-26 DIAGNOSIS — E89.0 POSTSURGICAL HYPOTHYROIDISM: ICD-10-CM

## 2022-07-26 DIAGNOSIS — Z00.00 ENCOUNTER FOR ROUTINE HISTORY AND PHYSICAL EXAM IN FEMALE PATIENT: ICD-10-CM

## 2022-07-26 DIAGNOSIS — M10.00 IDIOPATHIC GOUT, UNSPECIFIED CHRONICITY, UNSPECIFIED SITE: ICD-10-CM

## 2022-07-26 DIAGNOSIS — L71.9 ROSACEA: ICD-10-CM

## 2022-07-26 LAB
BUN SERPL-MCNC: 25 MG/DL (ref 7–25)
BUN/CREATININE RATIO: 22.3 (ref 6–22)
CALCIUM SERPL-MCNC: 9.8 MG/DL (ref 8.6–10.3)
CHLORIDE SERPLBLD-SCNC: 104.1 MMOL/L (ref 98–110)
CHOLEST SERPL-MCNC: 177 MG/DL (ref 0–199)
CHOLEST/HDLC SERPL: 5 {RATIO} (ref 0–5)
CO2 SERPL-SCNC: 26.9 MMOL/L (ref 20–32)
CREAT SERPL-MCNC: 1.12 MG/DL (ref 0.6–1.3)
GLUCOSE SERPL-MCNC: 102 MG/DL (ref 60–99)
HDLC SERPL-MCNC: 39 MG/DL (ref 40–150)
LDLC SERPL CALC-MCNC: 108 MG/DL (ref 0–130)
POTASSIUM SERPL-SCNC: 4.97 MMOL/L (ref 3.5–5.3)
SODIUM SERPL-SCNC: 140.1 MMOL/L (ref 135–146)
TRIGL SERPL-MCNC: 149 MG/DL (ref 0–149)

## 2022-07-26 PROCEDURE — 80061 LIPID PANEL: CPT | Performed by: FAMILY MEDICINE

## 2022-07-26 PROCEDURE — 36415 COLL VENOUS BLD VENIPUNCTURE: CPT | Performed by: FAMILY MEDICINE

## 2022-07-26 PROCEDURE — 80048 BASIC METABOLIC PNL TOTAL CA: CPT | Performed by: FAMILY MEDICINE

## 2022-07-26 PROCEDURE — 99397 PER PM REEVAL EST PAT 65+ YR: CPT | Performed by: FAMILY MEDICINE

## 2022-07-26 RX ORDER — INDOMETHACIN 50 MG/1
50 CAPSULE ORAL 3 TIMES DAILY PRN
Qty: 30 CAPSULE | Refills: 0 | Status: SHIPPED | OUTPATIENT
Start: 2022-07-26 | End: 2023-08-31

## 2022-07-26 RX ORDER — LEVOTHYROXINE SODIUM 100 UG/1
TABLET ORAL
Qty: 90 TABLET | Refills: 3 | Status: SHIPPED | OUTPATIENT
Start: 2022-07-26 | End: 2023-08-29

## 2022-07-26 NOTE — NURSING NOTE
Chief Complaint   Patient presents with     Physical     Fasting today     Pre-visit Screening:  Immunizations:  up to date  Colonoscopy:  is up to date  Mammogram: is due and ordered today  Asthma Action Test/Plan:  NA  PHQ9:  NA  GAD7:  NA  Questioned patient about current smoking habits Pt. has never smoked.  Ok to leave detailed message on voice mail for today's visit only Yes, phone # [phone]

## 2022-07-27 LAB
TSH SERPL-ACNC: 0.43 MIU/L (ref 0.4–4.5)
URATE SERPL-MCNC: 8.7 MG/DL (ref 2.5–7)

## 2022-10-09 ENCOUNTER — HEALTH MAINTENANCE LETTER (OUTPATIENT)
Age: 72
End: 2022-10-09

## 2023-08-28 NOTE — PROGRESS NOTES
SUBJECTIVE:   CC: Nohemi is an 73 year old who presents for preventive health visit.     HPI    Here for a physical.  She is due for fasting labs, refills on her thyroid medication, metronidazole topical for rosacea. She is using these regularly.  Also takes indomethacin as needed for gout, has a gout attack once a year approximately.                Social History     Tobacco Use    Smoking status: Never    Smokeless tobacco: Never   Substance Use Topics    Alcohol use: No     Alcohol/week: 0.0 standard drinks of alcohol     Comment: maybe 1 time a year              No data to display            No concerns  Reviewed orders with patient.  Reviewed health maintenance and updated orders accordingly - Yes  BP Readings from Last 3 Encounters:   23 118/74   22 124/72   22 138/78    Wt Readings from Last 3 Encounters:   23 101.2 kg (223 lb)   22 99.2 kg (218 lb 9.6 oz)   22 100.7 kg (222 lb)                  Patient Active Problem List   Diagnosis    Allergic rhinitis    Open-angle glaucoma    ACP (advance care planning)    Nontoxic uninodular goiter    Health Care Home    Primary localized osteoarthrosis, lower leg    Rosacea    Acute idiopathic gout of right foot    Obesity (BMI 35.0-39.9) with comorbidity (H)    History of colonic polyps     Past Surgical History:   Procedure Laterality Date    C ANESTH, SECTION      CATARACT IOL, RT/LT  oct 2015    both eyes,     DAVINCI HYSTERECTOMY TOTAL, BILATERAL SALPINGO-OOPHORECTOMY, COMBINED  2013    Procedure: COMBINED DAVINCI HYSTERECTOMY TOTAL, SALPINGO-OOPHORECTOMY;  DAVINCI LAPAROSCOPIC ASSISTED TOTAL HYSTERECTOMY, BILATERAL SALPINGO OOPHORECTOMY;  Surgeon: Manohar Peñaloza MD;  Location:  OR    DILATION AND CURETTAGE, HYSTEROSCOPY DIAGNOSTIC, COMBINED  2013    Procedure: COMBINED DILATION AND CURETTAGE, HYSTEROSCOPY DIAGNOSTIC;  DILATION AND CURETTAGE, HYSTEROSCOPY DIAGNOSTIC ;  Surgeon: Patty Walker MD;   Location: [facility] OR    GLAUCOMA SURGERY  oct 2015    both eyes, stents in,     ORTHOPEDIC SURGERY      right knee scope    ZZC THYR LOBECTOMY+PART REMOV CONTRA  1987    right lobe, colloid adenoma, dr rivera    [facility] COLONOSCOPY THRU STOMA, DIAGNOSTIC  1999    negative        Social History     Tobacco Use    Smoking status: Never     Passive exposure: Never    Smokeless tobacco: Never   Substance Use Topics    Alcohol use: No     Alcohol/week: 0.0 standard drinks of alcohol     Comment: maybe 1 time a year     Family History   Problem Relation Age of Onset    Eye Disorder Mother         glaucoma    Gastrointestinal Disease Brother         crohns    Cancer - colorectal Brother         anal cancer     Genetic Disorder Child         gout         Current Outpatient Medications   Medication Sig Dispense Refill    indomethacin (INDOCIN) 50 MG capsule Take 1 capsule (50 mg) by mouth 3 times daily as needed for other (gout) 30 capsule 0    levothyroxine (SYNTHROID/LEVOTHROID) 100 MCG tablet TAKE 1 TABLET BY MOUTH EVERY DAY 90 tablet 3    metroNIDAZOLE (METROCREAM) 0.75 % external cream APPLY EXTERNALLY TO THE AFFECTED AREA TWICE DAILY 45 g 1       Breast Cancer Screening:    Mammogram referral done.  Pertinent mammograms are reviewed under the imaging tab.    History of abnormal Pap smear: hysterectomy     Reviewed and updated as needed this visit by clinical staff                  Reviewed and updated as needed this visit by Provider                 Past Medical History:   Diagnosis Date    Acute idiopathic gout of right foot 4/2/2018    Allergic rhinitis      Problem list name updated by automated process. Provider to review    Arthritis     left knee    Atypical endometrial hyperplasia 3/6/2013    Chronic pain     left knee    Chronic pain of left knee     Glaucoma     dr Mohr    Nontoxic uninodular goiter     Obesity due to excess calories, unspecified obesity severity 2/2/2017    Open-angle glaucoma      Problem list  name updated by automated process. Provider to review    Rosacea 2017      Past Surgical History:   Procedure Laterality Date    C ANESTH, SECTION      CATARACT IOL, RT/LT  oct 2015    both eyes,     DAVINCI HYSTERECTOMY TOTAL, BILATERAL SALPINGO-OOPHORECTOMY, COMBINED  2013    Procedure: COMBINED DAVINCI HYSTERECTOMY TOTAL, SALPINGO-OOPHORECTOMY;  DAVINCI LAPAROSCOPIC ASSISTED TOTAL HYSTERECTOMY, BILATERAL SALPINGO OOPHORECTOMY;  Surgeon: Manohar Peñaloza MD;  Location: [facility] OR    DILATION AND CURETTAGE, HYSTEROSCOPY DIAGNOSTIC, COMBINED  2013    Procedure: COMBINED DILATION AND CURETTAGE, HYSTEROSCOPY DIAGNOSTIC;  DILATION AND CURETTAGE, HYSTEROSCOPY DIAGNOSTIC ;  Surgeon: Patty Walker MD;  Location: [facility] OR    GLAUCOMA SURGERY  oct 2015    both eyes, stents in,     ORTHOPEDIC SURGERY      right knee scope    ZZC THYR LOBECTOMY+PART REMOV CONTRA      right lobe, colloid adenoma, dr rivera    [facility] COLONOSCOPY THRU STOMA, DIAGNOSTIC      negative        Review of Systems  CONSTITUTIONAL: NEGATIVE for fever, chills, change in weight  INTEGUMENTARY/SKIN: NEGATIVE for worrisome rashes, moles or lesions  EYES: NEGATIVE for vision changes or irritation  ENT: NEGATIVE for ear, mouth and throat problems  RESP: NEGATIVE for significant cough or SOB  BREAST: NEGATIVE for masses, tenderness or discharge  CV: NEGATIVE for chest pain, palpitations or peripheral edema  GI: NEGATIVE for nausea, abdominal pain, heartburn, or change in bowel habits  : NEGATIVE for unusual urinary or vaginal symptoms. No vaginal bleeding.  MUSCULOSKELETAL: NEGATIVE for significant arthralgias or myalgia  NEURO: NEGATIVE for weakness, dizziness or paresthesias  PSYCHIATRIC: NEGATIVE for changes in mood or affect      OBJECTIVE:   LMP  (LMP Unknown)   Physical Exam  GENERAL: healthy, alert and no distress  EYES: Eyes grossly normal to inspection, PERRL and conjunctivae and sclerae normal  HENT: ear canals and TM's  normal, nose and mouth without ulcers or lesions  NECK: no adenopathy, no asymmetry, masses, or scars and thyroid normal to palpation  RESP: lungs clear to auscultation - no rales, rhonchi or wheezes  BREAST: normal without masses, tenderness or nipple discharge and no palpable axillary masses or adenopathy  CV: regular rate and rhythm, normal S1 S2, no S3 or S4, no murmur, click or rub, no peripheral edema and peripheral pulses strong  ABDOMEN: soft, nontender, no hepatosplenomegaly, no masses and bowel sounds normal  MS: no gross musculoskeletal defects noted, no edema  SKIN: no suspicious lesions or rashes  NEURO: Normal strength and tone, mentation intact and speech normal  PSYCH: mentation appears normal, affect normal/bright  Hysterectomy, declined any pelvic exam    Diagnostic Test Results:  Labs reviewed in Epic  No results found for any visits on 08/31/23.    ASSESSMENT/PLAN:   1. Encounter for routine history and physical exam in female patient    - Lipid Panel (BFP)  - Basic Metabolic Panel (BFP)    2. Initial Medicare annual wellness visit  Completed.    3. Postsurgical hypothyroidism  Check labs, refilled medications.  - levothyroxine (SYNTHROID/LEVOTHROID) 100 MCG tablet; TAKE 1 TABLET BY MOUTH EVERY DAY  Dispense: 90 tablet; Refill: 3  - VENOUS COLLECTION  - TSH WITH FREE T4 REFLEX ([facility])    4. Idiopathic gout, unspecified chronicity, unspecified site  Check labs, refilled.  - indomethacin (INDOCIN) 50 MG capsule; Take 1 capsule (50 mg) by mouth 3 times daily as needed for other (gout)  Dispense: 30 capsule; Refill: 0  - Uric Acid (BFP)    5. Rosacea  Refilled.  - metroNIDAZOLE (METROCREAM) 0.75 % external cream; APPLY EXTERNALLY TO THE AFFECTED AREA TWICE DAILY  Dispense: 45 g; Refill: 1    6. Encounter for screening mammogram for breast cancer    - MA Screening Bilateral w/ Swapnil  - Radiology Referral    7. Osteopenia of other site    - Radiology Referral  - Dexa hip/pelvis/spine*     Patient has  been advised of split billing requirements and indicates understanding: Yes      COUNSELING:  Reviewed preventive health counseling, as reflected in patient instructions       Regular exercise       Healthy diet/nutrition        She reports that she has never smoked. She has never used smokeless tobacco.          Francy Sheehan MD  [facility]

## 2023-08-29 DIAGNOSIS — E89.0 POSTSURGICAL HYPOTHYROIDISM: ICD-10-CM

## 2023-08-29 RX ORDER — LEVOTHYROXINE SODIUM 100 UG/1
TABLET ORAL
Qty: 10 TABLET | Refills: 0 | Status: SHIPPED | OUTPATIENT
Start: 2023-08-29 | End: 2023-08-31

## 2023-08-29 NOTE — TELEPHONE ENCOUNTER
Pt is requesting an extension of levothyroxine.  Has appt this Thursday.  Routing to on callGonzalo.      Pending Prescriptions:                       Disp   Refills    levothyroxine (SYNTHROID/LEVOTHROID) 100 *10 tab*0            Sig: TAKE 1 TABLET BY MOUTH EVERY DAY

## 2023-08-31 ENCOUNTER — OFFICE VISIT (OUTPATIENT)
Dept: FAMILY MEDICINE | Facility: CLINIC | Age: 73
End: 2023-08-31

## 2023-08-31 VITALS
DIASTOLIC BLOOD PRESSURE: 74 MMHG | SYSTOLIC BLOOD PRESSURE: 118 MMHG | TEMPERATURE: 97.1 F | BODY MASS INDEX: 38.07 KG/M2 | WEIGHT: 223 LBS | OXYGEN SATURATION: 97 % | HEART RATE: 57 BPM | HEIGHT: 64 IN

## 2023-08-31 DIAGNOSIS — Z00.00 INITIAL MEDICARE ANNUAL WELLNESS VISIT: ICD-10-CM

## 2023-08-31 DIAGNOSIS — Z12.31 ENCOUNTER FOR SCREENING MAMMOGRAM FOR BREAST CANCER: ICD-10-CM

## 2023-08-31 DIAGNOSIS — L71.9 ROSACEA: ICD-10-CM

## 2023-08-31 DIAGNOSIS — M10.00 IDIOPATHIC GOUT, UNSPECIFIED CHRONICITY, UNSPECIFIED SITE: ICD-10-CM

## 2023-08-31 DIAGNOSIS — M85.88 OSTEOPENIA OF OTHER SITE: ICD-10-CM

## 2023-08-31 DIAGNOSIS — E89.0 POSTSURGICAL HYPOTHYROIDISM: ICD-10-CM

## 2023-08-31 DIAGNOSIS — Z00.00 ENCOUNTER FOR ROUTINE HISTORY AND PHYSICAL EXAM IN FEMALE PATIENT: Primary | ICD-10-CM

## 2023-08-31 LAB
BUN SERPL-MCNC: 16 MG/DL (ref 7–25)
BUN/CREATININE RATIO: 15.7 (ref 6–32)
CALCIUM SERPL-MCNC: 9.4 MG/DL (ref 8.6–10.3)
CHLORIDE SERPLBLD-SCNC: 104.9 MMOL/L (ref 98–110)
CHOLEST SERPL-MCNC: 193 MG/DL (ref 0–199)
CHOLEST/HDLC SERPL: 4 {RATIO} (ref 0–5)
CO2 SERPL-SCNC: 30 MMOL/L (ref 20–32)
CREAT SERPL-MCNC: 1.02 MG/DL (ref 0.6–1.3)
GLUCOSE SERPL-MCNC: 96 MG/DL (ref 60–99)
HDLC SERPL-MCNC: 44 MG/DL (ref 40–150)
LDLC SERPL CALC-MCNC: 120 MG/DL (ref 0–130)
POTASSIUM SERPL-SCNC: 4.49 MMOL/L (ref 3.5–5.3)
SODIUM SERPL-SCNC: 141.5 MMOL/L (ref 135–146)
TRIGL SERPL-MCNC: 146 MG/DL (ref 0–149)
URIC ACID (BFP): 7.2 (ref 2.5–7)

## 2023-08-31 PROCEDURE — 36415 COLL VENOUS BLD VENIPUNCTURE: CPT | Performed by: FAMILY MEDICINE

## 2023-08-31 PROCEDURE — G0438 PPPS, INITIAL VISIT: HCPCS | Performed by: FAMILY MEDICINE

## 2023-08-31 PROCEDURE — 80048 BASIC METABOLIC PNL TOTAL CA: CPT | Performed by: FAMILY MEDICINE

## 2023-08-31 PROCEDURE — 84550 ASSAY OF BLOOD/URIC ACID: CPT | Performed by: FAMILY MEDICINE

## 2023-08-31 PROCEDURE — 99397 PER PM REEVAL EST PAT 65+ YR: CPT | Mod: 25 | Performed by: FAMILY MEDICINE

## 2023-08-31 PROCEDURE — 80061 LIPID PANEL: CPT | Performed by: FAMILY MEDICINE

## 2023-08-31 RX ORDER — LEVOTHYROXINE SODIUM 100 UG/1
TABLET ORAL
Qty: 90 TABLET | Refills: 3 | Status: SHIPPED | OUTPATIENT
Start: 2023-08-31 | End: 2024-09-03

## 2023-08-31 RX ORDER — INDOMETHACIN 50 MG/1
50 CAPSULE ORAL 3 TIMES DAILY PRN
Qty: 30 CAPSULE | Refills: 0 | Status: SHIPPED | OUTPATIENT
Start: 2023-08-31 | End: 2024-09-03

## 2023-08-31 NOTE — NURSING NOTE
Chief Complaint   Patient presents with    Physical     Fasting today     Recheck Medication     Refill medications     Pre-visit Screening:  Immunizations:  up to date  Colonoscopy:  is up to date  Mammogram: is due and ordered today  Asthma Action Test/Plan:  NA  PHQ9:  NA  GAD7:  NA  Questioned patient about current smoking habits Pt. has never smoked.  Ok to leave detailed message on voice mail for today's visit only Yes, phone # [phone]

## 2023-08-31 NOTE — PATIENT INSTRUCTIONS
Health Maintenance   Topic Date Due    COVID-19 Vaccine (6 - Pfizer series) 03/08/2023    MEDICARE ANNUAL WELLNESS VISIT  07/26/2023    TSH W/FREE T4 REFLEX  07/26/2023    MAMMO SCREENING  09/21/2023    INFLUENZA VACCINE (1) 09/01/2023    FALL RISK ASSESSMENT  08/31/2024    COLORECTAL CANCER SCREENING  07/08/2025    LIPID  07/26/2027    ADVANCE CARE PLANNING  07/26/2027    DTAP/TDAP/TD IMMUNIZATION (3 - Td or Tdap) 06/17/2030    DEXA  07/14/2036    HEPATITIS C SCREENING  Completed    PHQ-2 (once per calendar year)  Completed    Pneumococcal Vaccine: 65+ Years  Completed    ZOSTER IMMUNIZATION  Completed    IPV IMMUNIZATION  Aged Out    HPV IMMUNIZATION  Aged Out    MENINGITIS IMMUNIZATION  Aged Out

## 2023-08-31 NOTE — PROGRESS NOTES
Nohemi Orlando is a 73 year old female who presents for Medicare Annual Wellness Visit.    Current providers caring for this patient include:  Patient Care Team:  Francy Sheehan MD as PCP - General (Family Medicine)  Francy Sheehan MD as Assigned PCP    Complete Medical and Social history reviewed with patient, outlined below.    Patient Active Problem List   Diagnosis    Allergic rhinitis    Open-angle glaucoma    ACP (advance care planning)    Nontoxic uninodular goiter    Health Care Home    Primary localized osteoarthrosis, lower leg    Rosacea    Acute idiopathic gout of right foot    Obesity (BMI 35.0-39.9) with comorbidity (H)    History of colonic polyps       Past Medical History:   Diagnosis Date    Acute idiopathic gout of right foot 2018    Allergic rhinitis      Problem list name updated by automated process. Provider to review    Arthritis     left knee    Atypical endometrial hyperplasia 3/6/2013    Chronic pain     left knee    Chronic pain of left knee     Glaucoma     dr Mohr    Nontoxic uninodular goiter     Obesity due to excess calories, unspecified obesity severity 2017    Open-angle glaucoma      Problem list name updated by automated process. Provider to review    Rosacea 2017       Past Surgical History:   Procedure Laterality Date    C ANESTH, SECTION      CATARACT IOL, RT/LT  oct 2015    both eyes,     DAVINCI HYSTERECTOMY TOTAL, BILATERAL SALPINGO-OOPHORECTOMY, COMBINED  2013    Procedure: COMBINED DAVINCI HYSTERECTOMY TOTAL, SALPINGO-OOPHORECTOMY;  DAVINCI LAPAROSCOPIC ASSISTED TOTAL HYSTERECTOMY, BILATERAL SALPINGO OOPHORECTOMY;  Surgeon: Manohar Peñaloza MD;  Location:  OR    DILATION AND CURETTAGE, HYSTEROSCOPY DIAGNOSTIC, COMBINED  2013    Procedure: COMBINED DILATION AND CURETTAGE, HYSTEROSCOPY DIAGNOSTIC;  DILATION AND CURETTAGE, HYSTEROSCOPY DIAGNOSTIC ;  Surgeon: Patty Walker MD;  Location:  OR    GLAUCOMA SURGERY  oct 2015     "both eyes, stents in,     ORTHOPEDIC SURGERY      right knee scope    C THYR LOBECTOMY+PART REMOV CONTRA  1987    right lobe, colloid adenoma, dr rivera    [facility] COLONOSCOPY THRU STOMA, DIAGNOSTIC  1999    negative        Family History   Problem Relation Age of Onset    Eye Disorder Mother         glaucoma    Gastrointestinal Disease Brother         crohns    Cancer - colorectal Brother         anal cancer     Genetic Disorder Child         gout       Social History     Tobacco Use    Smoking status: Never     Passive exposure: Never    Smokeless tobacco: Never   Substance Use Topics    Alcohol use: No     Alcohol/week: 0.0 standard drinks of alcohol     Comment: maybe 1 time a year       Diet: regular, low salt/low fat  Physical Activity: generally inactive  Depression Screen:    Over the past 2 weeks, patient has felt down, depressed, or hopeless:  No    Over the past 2 weeks, patient has felt little interest or pleasure in doing things: No    Functional ability/Safety screen:  Up and go test (able to get up and walk longer than 30 seconds): Passed  Patient needs assistance with: nothing  Patient's home has the following possible safety concerns: none identified  Patient has concerns about her hearing:  No  Cognitive Screen  Patient repeats three objects (ball, flag, tree)      Clock drawing test:   NORMAL  Recalls three objects after 3 minutes (ball,flag,tree):         recalls 3 objects (3 points)    Physical Exam:  /74 (BP Location: Right arm, Patient Position: Sitting, Cuff Size: Adult Large)   Pulse 57   Temp 97.1  F (36.2  C) (Temporal)   Ht 1.626 m (5' 4\")   Wt 101.2 kg (223 lb)   LMP  (LMP Unknown)   SpO2 97%   BMI 38.28 kg/m     Body mass index is 38.28 kg/m .        Health Maintenance   Topic Date Due    COVID-19 Vaccine (6 - Pfizer series) 03/08/2023    MEDICARE ANNUAL WELLNESS VISIT  07/26/2023    TSH W/FREE T4 REFLEX  07/26/2023    MAMMO SCREENING  09/21/2023    INFLUENZA VACCINE (1) " 09/01/2023    FALL RISK ASSESSMENT  08/31/2024    COLORECTAL CANCER SCREENING  07/08/2025    LIPID  07/26/2027    ADVANCE CARE PLANNING  07/26/2027    DTAP/TDAP/TD IMMUNIZATION (3 - Td or Tdap) 06/17/2030    DEXA  07/14/2036    HEPATITIS C SCREENING  Completed    PHQ-2 (once per calendar year)  Completed    Pneumococcal Vaccine: 65+ Years  Completed    ZOSTER IMMUNIZATION  Completed    IPV IMMUNIZATION  Aged Out    HPV IMMUNIZATION  Aged Out    MENINGITIS IMMUNIZATION  Aged Out           End of Life Planning:   Patient currently has an advanced directive: No.  I have verified the patient's ablity to prepare an advanced directive/make health care decisions.  Literature was provided to assist patient in preparing an advanced directive.    Education/Counseling:   Based on review of the above information, the following items were addressed:      Healthy diet and regular exercise    Appropriate preventive services were discussed with this patient, including applicable screening as appropriate for cardiovascular disease, diabetes, osteopenia/osteoporosis, and glaucoma.  As appropriate for age/gender, discussed screening for colorectal cancer, prostate cancer, breast cancer, and cervical cancer.   Checklist reviewing preventive services available has been given to the patient.

## 2023-09-01 LAB — TSH SERPL-ACNC: 1.85 MIU/L (ref 0.4–4.5)

## 2023-09-26 LAB — MAMMOGRAM: NORMAL

## 2023-10-26 ENCOUNTER — PATIENT OUTREACH (OUTPATIENT)
Dept: GASTROENTEROLOGY | Facility: CLINIC | Age: 73
End: 2023-10-26
Payer: MEDICARE

## 2024-08-30 NOTE — PROGRESS NOTES
Preventive Care Visit  [facility]  Francy Sheehan MD, Family Medicine  Sep 3, 2024       SUBJECTIVE:   Nohemi is a 74 year old, presenting for the following:  Physical (Fasting today ) and Arthritis (Discuss daily medication for gout)      HPI    Here for a physical. She is fasting.  Would like a refill on her thyroid medication, is doing well on this.  Also using indocin as needed for gout attacks, wondering if she can go on a daily medication: allopurinol, like her son also takes to prevent gout.                 Social History     Tobacco Use    Smoking status: Never     Passive exposure: Never    Smokeless tobacco: Never   Substance Use Topics    Alcohol use: Yes     Alcohol/week: 1.0 standard drink of alcohol     Types: 1 Standard drinks or equivalent per week     Comment: maybe 1 time a year              No data to display            No concerns  Reviewed orders with patient.  Reviewed health maintenance and updated orders accordingly - Yes  BP Readings from Last 3 Encounters:   24 116/78   23 118/74   22 124/72    Wt Readings from Last 3 Encounters:   24 97.1 kg (214 lb)   23 101.2 kg (223 lb)   22 99.2 kg (218 lb 9.6 oz)                  Patient Active Problem List   Diagnosis    Allergic rhinitis    Open-angle glaucoma    ACP (advance care planning)    Nontoxic uninodular goiter    Primary localized osteoarthrosis, lower leg    Rosacea    Acute idiopathic gout of right foot    Obesity (BMI 35.0-39.9) with comorbidity (H)    History of colonic polyps     Past Surgical History:   Procedure Laterality Date    C ANESTH, SECTION      CATARACT IOL, RT/LT  oct 2015    both eyes,     DAVINCI HYSTERECTOMY TOTAL, BILATERAL SALPINGO-OOPHORECTOMY, COMBINED  2013    Procedure: COMBINED DAVINCI HYSTERECTOMY TOTAL, SALPINGO-OOPHORECTOMY;  DAVINCI LAPAROSCOPIC ASSISTED TOTAL HYSTERECTOMY, BILATERAL SALPINGO OOPHORECTOMY;  Surgeon: Manohar Peñaloza,  MD;  Location: [facility] OR    DILATION AND CURETTAGE, HYSTEROSCOPY DIAGNOSTIC, COMBINED  2/13/2013    Procedure: COMBINED DILATION AND CURETTAGE, HYSTEROSCOPY DIAGNOSTIC;  DILATION AND CURETTAGE, HYSTEROSCOPY DIAGNOSTIC ;  Surgeon: Patty Walker MD;  Location: [facility] OR    GLAUCOMA SURGERY  oct 2015    both eyes, stents in,     ORTHOPEDIC SURGERY      right knee scope    ZZC THYR LOBECTOMY+PART REMOV CONTRA  1987    right lobe, colloid adenoma, dr rivera    [facility]  [facility]    negative        Social History     Tobacco Use    Smoking status: Never     Passive exposure: Never    Smokeless tobacco: Never   Substance Use Topics    Alcohol use: Yes     Alcohol/week: 1.0 standard drink of alcohol     Types: 1 Standard drinks or equivalent per week     Comment: maybe 1 time a year     Family History   Problem Relation Age of Onset    Eye Disorder Mother         glaucoma    Gastrointestinal Disease Brother         crohns    Cancer - colorectal Brother         anal cancer     Genetic Disorder Child         gout         Current Outpatient Medications   Medication Sig Dispense Refill    allopurinol (ZYLOPRIM) 100 MG tablet Take 1 tablet (100 mg) by mouth daily. 90 tablet 1    indomethacin (INDOCIN) 50 MG capsule Take 1 capsule (50 mg) by mouth 3 times daily as needed for other (gout). 30 capsule 0    levothyroxine (SYNTHROID/LEVOTHROID) 100 MCG tablet TAKE 1 TABLET BY MOUTH EVERY DAY 90 tablet 3    metroNIDAZOLE (METROCREAM) 0.75 % external cream APPLY EXTERNALLY TO THE AFFECTED AREA TWICE DAILY 45 g 1       Breast Cancer Screening:    Mammogram referral done.  Pertinent mammograms are reviewed under the imaging tab.         Reviewed and updated as needed this visit by clinical staff   Tobacco  Allergies   Problems             Reviewed and updated as needed this visit by Provider                  Past Medical History:   Diagnosis Date    Acute idiopathic gout of right foot 4/2/2018    Allergic rhinitis   "    Problem list name updated by automated process. Provider to review    Arthritis     left knee    Atypical endometrial hyperplasia 3/6/2013    Chronic pain     left knee    Chronic pain of left knee     Glaucoma     dr Mohr    Nontoxic uninodular goiter     Obesity due to excess calories, unspecified obesity severity 2017    Open-angle glaucoma      Problem list name updated by automated process. Provider to review    Rosacea 2017      Past Surgical History:   Procedure Laterality Date    C ANESTH, SECTION      CATARACT IOL, RT/LT  oct 2015    both eyes,     DAVINCI HYSTERECTOMY TOTAL, BILATERAL SALPINGO-OOPHORECTOMY, COMBINED  2013    Procedure: COMBINED DAVINCI HYSTERECTOMY TOTAL, SALPINGO-OOPHORECTOMY;  DAVINCI LAPAROSCOPIC ASSISTED TOTAL HYSTERECTOMY, BILATERAL SALPINGO OOPHORECTOMY;  Surgeon: Manohar Peñaloza MD;  Location:  OR    DILATION AND CURETTAGE, HYSTEROSCOPY DIAGNOSTIC, COMBINED  2013    Procedure: COMBINED DILATION AND CURETTAGE, HYSTEROSCOPY DIAGNOSTIC;  DILATION AND CURETTAGE, HYSTEROSCOPY DIAGNOSTIC ;  Surgeon: Patty Walker MD;  Location: [facility] OR    GLAUCOMA SURGERY  oct 2015    both eyes, stents in,     ORTHOPEDIC SURGERY      right knee scope    [facility] THYR LOBECTOMY+PART REMOV CONTRA      right lobe, colloid adenoma, dr rivera    [facility] COLONOSCOPY THRU STOMA, DIAGNOSTIC      negative      Review of Systems    Review of Systems  Constitutional, HEENT, cardiovascular, pulmonary, gi and gu systems are negative, except as otherwise noted.    OBJECTIVE:   /78 (BP Location: Right arm, Patient Position: Sitting, Cuff Size: Adult Large)   Pulse 85   Temp 98.1  F (36.7  C) (Temporal)   Ht 1.619 m (5' 3.75\")   Wt 97.1 kg (214 lb)   LMP  (LMP Unknown)   SpO2 96%   BMI 37.02 kg/m     Estimated body mass index is 37.02 kg/m  as calculated from the following:    Height as of this encounter: 1.619 m (5' 3.75\").    Weight as of this encounter: 97.1 kg (214 " lb).  Physical Exam  GENERAL: alert and no distress  EYES: Eyes grossly normal to inspection, PERRL and conjunctivae and sclerae normal  HENT: ear canals and TM's normal, nose and mouth without ulcers or lesions  NECK: no adenopathy, no asymmetry, masses, or scars  RESP: lungs clear to auscultation - no rales, rhonchi or wheezes  BREAST: normal without masses, tenderness or nipple discharge and no palpable axillary masses or adenopathy  CV: regular rate and rhythm, normal S1 S2, no S3 or S4, no murmur, click or rub, no peripheral edema  ABDOMEN: soft, nontender, no hepatosplenomegaly, no masses and bowel sounds normal  MS: no gross musculoskeletal defects noted, no edema  SKIN: no suspicious lesions or rashes  NEURO: Normal strength and tone, mentation intact and speech normal  PSYCH: mentation appears normal, affect normal/bright  Declined pelvic exam    Diagnostic Test Results:  Labs reviewed in Epic  No results found for any visits on 09/03/24.    ASSESSMENT/PLAN:   1. Encounter for routine history and physical exam in female patient    - VENOUS COLLECTION  - Lipid Panel (BFP)    2. Medicare annual wellness visit, subsequent  Completed.    3. Postsurgical hypothyroidism  Check labs, refilled.  - levothyroxine (SYNTHROID/LEVOTHROID) 100 MCG tablet; TAKE 1 TABLET BY MOUTH EVERY DAY  Dispense: 90 tablet; Refill: 3  - TSH WITH FREE T4 REFLEX ([facility])    4. Rosacea      5. Idiopathic gout, unspecified chronicity, unspecified site  Refilled indocin, check bmp. Also start allopurinol daily. She brought up colchicine, this may be an option if needed.  - indomethacin (INDOCIN) 50 MG capsule; Take 1 capsule (50 mg) by mouth 3 times daily as needed for other (gout).  Dispense: 30 capsule; Refill: 0  - Basic Metabolic Panel (BFP)  - Uric Acid (BFP)  - allopurinol (ZYLOPRIM) 100 MG tablet; Take 1 tablet (100 mg) by mouth daily.  Dispense: 90 tablet; Refill: 1    6. Encounter for screening mammogram for breast cancer    - MA  "Screening Bilateral w/ Swapnil  - Radiology Referral (Affiliate Use Only)    7. Acute idiopathic gout of right foot    - allopurinol (ZYLOPRIM) 100 MG tablet; Take 1 tablet (100 mg) by mouth daily.  Dispense: 90 tablet; Refill: 1    8. Obesity (BMI 35.0-39.9) with comorbidity (H)  Discussed healthy diet and regular exercise, in creasing activity level. Suggest WW.     Patient has been advised of split billing requirements and indicates understanding: Yes      Counseling  Reviewed preventive health counseling, as reflected in patient instructions       Regular exercise       Healthy diet/nutrition      BMI  Estimated body mass index is 37.02 kg/m  as calculated from the following:    Height as of this encounter: 1.619 m (5' 3.75\").    Weight as of this encounter: 97.1 kg (214 lb).   Weight management plan: Discussed healthy diet and exercise guidelines      She reports that she has never smoked. She has never been exposed to tobacco smoke. She has never used smokeless tobacco.          Signed Electronically by: Francy Sheehan MD  "

## 2024-09-03 ENCOUNTER — OFFICE VISIT (OUTPATIENT)
Dept: FAMILY MEDICINE | Facility: CLINIC | Age: 74
End: 2024-09-03

## 2024-09-03 VITALS
BODY MASS INDEX: 36.54 KG/M2 | OXYGEN SATURATION: 96 % | WEIGHT: 214 LBS | HEART RATE: 85 BPM | HEIGHT: 64 IN | TEMPERATURE: 98.1 F | DIASTOLIC BLOOD PRESSURE: 78 MMHG | SYSTOLIC BLOOD PRESSURE: 116 MMHG

## 2024-09-03 DIAGNOSIS — Z00.00 ENCOUNTER FOR ROUTINE HISTORY AND PHYSICAL EXAM IN FEMALE PATIENT: Primary | ICD-10-CM

## 2024-09-03 DIAGNOSIS — M10.00 IDIOPATHIC GOUT, UNSPECIFIED CHRONICITY, UNSPECIFIED SITE: ICD-10-CM

## 2024-09-03 DIAGNOSIS — E89.0 POSTSURGICAL HYPOTHYROIDISM: ICD-10-CM

## 2024-09-03 DIAGNOSIS — M10.071 ACUTE IDIOPATHIC GOUT OF RIGHT FOOT: ICD-10-CM

## 2024-09-03 DIAGNOSIS — L71.9 ROSACEA: ICD-10-CM

## 2024-09-03 DIAGNOSIS — E66.01 MORBID OBESITY (H): ICD-10-CM

## 2024-09-03 DIAGNOSIS — Z00.00 MEDICARE ANNUAL WELLNESS VISIT, SUBSEQUENT: ICD-10-CM

## 2024-09-03 DIAGNOSIS — Z12.31 ENCOUNTER FOR SCREENING MAMMOGRAM FOR BREAST CANCER: ICD-10-CM

## 2024-09-03 LAB
BUN SERPL-MCNC: 20 MG/DL (ref 7–25)
BUN/CREATININE RATIO: 19 (ref 6–32)
CALCIUM SERPL-MCNC: 9.9 MG/DL (ref 8.6–10.3)
CHLORIDE SERPLBLD-SCNC: 104.6 MMOL/L (ref 98–110)
CHOLEST SERPL-MCNC: 190 MG/DL (ref 0–199)
CHOLEST/HDLC SERPL: 4 {RATIO} (ref 0–5)
CO2 SERPL-SCNC: 27 MMOL/L (ref 20–32)
CREAT SERPL-MCNC: 1.06 MG/DL (ref 0.6–1.3)
GLUCOSE SERPL-MCNC: 86 MG/DL (ref 60–99)
HDLC SERPL-MCNC: 49 MG/DL (ref 40–150)
LDLC SERPL CALC-MCNC: 120 MG/DL
POTASSIUM SERPL-SCNC: 4.54 MMOL/L (ref 3.5–5.3)
SODIUM SERPL-SCNC: 140.8 MMOL/L (ref 135–146)
TRIGL SERPL-MCNC: 106 MG/DL (ref 0–149)
URIC ACID (BFP): 7.5 (ref 2.5–7)

## 2024-09-03 PROCEDURE — 99213 OFFICE O/P EST LOW 20 MIN: CPT | Mod: 25 | Performed by: FAMILY MEDICINE

## 2024-09-03 PROCEDURE — G0439 PPPS, SUBSEQ VISIT: HCPCS | Performed by: FAMILY MEDICINE

## 2024-09-03 PROCEDURE — 80048 BASIC METABOLIC PNL TOTAL CA: CPT | Performed by: FAMILY MEDICINE

## 2024-09-03 PROCEDURE — 99397 PER PM REEVAL EST PAT 65+ YR: CPT | Mod: 25 | Performed by: FAMILY MEDICINE

## 2024-09-03 PROCEDURE — 36415 COLL VENOUS BLD VENIPUNCTURE: CPT | Performed by: FAMILY MEDICINE

## 2024-09-03 PROCEDURE — 84550 ASSAY OF BLOOD/URIC ACID: CPT | Performed by: FAMILY MEDICINE

## 2024-09-03 PROCEDURE — 80061 LIPID PANEL: CPT | Performed by: FAMILY MEDICINE

## 2024-09-03 RX ORDER — LEVOTHYROXINE SODIUM 100 UG/1
TABLET ORAL
Qty: 90 TABLET | Refills: 3 | Status: SHIPPED | OUTPATIENT
Start: 2024-09-03 | End: 2024-09-04

## 2024-09-03 RX ORDER — INDOMETHACIN 50 MG/1
50 CAPSULE ORAL 3 TIMES DAILY PRN
Qty: 30 CAPSULE | Refills: 0 | Status: SHIPPED | OUTPATIENT
Start: 2024-09-03

## 2024-09-03 RX ORDER — ALLOPURINOL 100 MG/1
100 TABLET ORAL DAILY
Qty: 90 TABLET | Refills: 1 | Status: SHIPPED | OUTPATIENT
Start: 2024-09-03

## 2024-09-03 NOTE — NURSING NOTE
Chief Complaint   Patient presents with    Physical     Fasting today     Arthritis     Discuss daily medication for gout     Pre-visit Screening:  Immunizations:  up to date  Colonoscopy:  is up to date  Mammogram: is due and ordered today  Asthma Action Test/Plan:  NA  PHQ9:  NA  GAD7:  NA  Questioned patient about current smoking habits Pt. has never smoked.  Ok to leave detailed message on voice mail for today's visit only Yes, phone # [phone]

## 2024-09-03 NOTE — PROGRESS NOTES
Nohemi Orlando is a 74 year old female who presents for Medicare Annual Wellness Visit.    Current providers caring for this patient include:  Patient Care Team:  Francy Sheehan MD as PCP - General (Family Medicine)  Francy Sheehan MD as Assigned PCP    Complete Medical and Social history reviewed with patient, outlined below.    Patient Active Problem List   Diagnosis    Allergic rhinitis    Open-angle glaucoma    ACP (advance care planning)    Nontoxic uninodular goiter    Primary localized osteoarthrosis, lower leg    Rosacea    Acute idiopathic gout of right foot    Obesity (BMI 35.0-39.9) with comorbidity (H)    History of colonic polyps       Past Medical History:   Diagnosis Date    Acute idiopathic gout of right foot 2018    Allergic rhinitis      Problem list name updated by automated process. Provider to review    Arthritis     left knee    Atypical endometrial hyperplasia 3/6/2013    Chronic pain     left knee    Chronic pain of left knee     Glaucoma     dr Mohr    Nontoxic uninodular goiter     Obesity due to excess calories, unspecified obesity severity 2017    Open-angle glaucoma      Problem list name updated by automated process. Provider to review    Rosacea 2017       Past Surgical History:   Procedure Laterality Date    C ANESTH, SECTION      CATARACT IOL, RT/LT  oct 2015    both eyes,     DAVINCI HYSTERECTOMY TOTAL, BILATERAL SALPINGO-OOPHORECTOMY, COMBINED  2013    Procedure: COMBINED DAVINCI HYSTERECTOMY TOTAL, SALPINGO-OOPHORECTOMY;  DAVINCI LAPAROSCOPIC ASSISTED TOTAL HYSTERECTOMY, BILATERAL SALPINGO OOPHORECTOMY;  Surgeon: Manohar Peñaloza MD;  Location:  OR    DILATION AND CURETTAGE, HYSTEROSCOPY DIAGNOSTIC, COMBINED  2013    Procedure: COMBINED DILATION AND CURETTAGE, HYSTEROSCOPY DIAGNOSTIC;  DILATION AND CURETTAGE, HYSTEROSCOPY DIAGNOSTIC ;  Surgeon: Patty Walker MD;  Location: [facility] OR    GLAUCOMA SURGERY  oct 2015    both eyes, stents  "in,     ORTHOPEDIC SURGERY      right knee scope    ZZC THYR LOBECTOMY+PART REMOV CONTRA  1987    right lobe, colloid adenoma, dr rivera    [facility]ST[facility]  [facility]    negative        Family History   Problem Relation Age of Onset    Eye Disorder Mother         glaucoma    Gastrointestinal Disease Brother         crohns    Cancer - colorectal Brother         anal cancer     Genetic Disorder Child         gout       Social History     Tobacco Use    Smoking status: Never     Passive exposure: Never    Smokeless tobacco: Never   Substance Use Topics    Alcohol use: Yes     Alcohol/week: 1.0 standard drink of alcohol     Types: 1 Standard drinks or equivalent per week     Comment: maybe 1 time a year       Diet: regular, low salt/low fat  Physical Activity: generally inactive  Depression Screen:    Over the past 2 weeks, patient has felt down, depressed, or hopeless:  No    Over the past 2 weeks, patient has felt little interest or pleasure in doing things: No    Functional ability/Safety screen:  Up and go test (able to get up and walk longer than 30 seconds): Passed  Patient needs assistance with: nothing  Patient's home has the following possible safety concerns: none identified  Patient has concerns about her hearing:  No  Cognitive Screen  Patient repeats three objects (ball, flag, tree)      Clock drawing test:   NORMAL  Recalls three objects after 3 minutes (ball,flag,tree):                                                                                               recalls 3 objects (3 points)    Physical Exam:  /78 (BP Location: Right arm, Patient Position: Sitting, Cuff Size: Adult Large)   Pulse 85   Temp 98.1  F (36.7  C) (Temporal)   Ht 1.619 m (5' 3.75\")   Wt 97.1 kg (214 lb)   LMP  (LMP Unknown)   SpO2 96%   BMI 37.02 kg/m     Body mass index is 37.02 kg/m .        Health Maintenance   Topic Date Due    INFLUENZA VACCINE (1) 09/01/2024    COVID-19 Vaccine (6 - 2023-24 " season) 09/01/2024    MEDICARE ANNUAL WELLNESS VISIT  08/31/2024    TSH W/FREE T4 REFLEX  08/31/2024    MAMMO SCREENING  09/26/2024    RSV VACCINE (1 - 1-dose 60+ series) 09/03/2025 (Originally 1/13/2008)    COLORECTAL CANCER SCREENING  07/08/2025    FALL RISK ASSESSMENT  09/03/2025    DEXA  09/26/2025    GLUCOSE  08/31/2026    ADVANCE CARE PLANNING  07/26/2027    LIPID  08/31/2028    DTAP/TDAP/TD IMMUNIZATION (3 - Td or Tdap) 06/17/2030    HEPATITIS C SCREENING  Completed    PHQ-2 (once per calendar year)  Completed    Pneumococcal Vaccine: 65+ Years  Completed    ZOSTER IMMUNIZATION  Completed    HPV IMMUNIZATION  Aged Out    MENINGITIS IMMUNIZATION  Aged Out    RSV MONOCLONAL ANTIBODY  Aged Out         End of Life Planning:   Patient currently has an advanced directive: No.  I have verified the patient's ablity to prepare an advanced directive/make health care decisions.  Literature was provided to assist patient in preparing an advanced directive.    Education/Counseling:   Based on review of the above information, the following items were addressed:      Obesity - appropriate counseling on diet, exercise, etc.    Appropriate preventive services were discussed with this patient, including applicable screening as appropriate for cardiovascular disease, diabetes, osteopenia/osteoporosis, and glaucoma.  As appropriate for age/gender, discussed screening for colorectal cancer, prostate cancer, breast cancer, and cervical cancer.   Checklist reviewing preventive services available has been given to the patient.     reviewed and is up to date

## 2024-09-03 NOTE — PATIENT INSTRUCTIONS
Health Maintenance   Topic Date Due    INFLUENZA VACCINE (1) 09/01/2024    COVID-19 Vaccine (6 - 2023-24 season) 09/01/2024    MEDICARE ANNUAL WELLNESS VISIT  08/31/2024    TSH W/FREE T4 REFLEX  08/31/2024    MAMMO SCREENING  09/26/2024    RSV VACCINE (1 - 1-dose 60+ series) 09/03/2025 (Originally 1/13/2008)    COLORECTAL CANCER SCREENING  07/08/2025    FALL RISK ASSESSMENT  09/03/2025    DEXA  09/26/2025    GLUCOSE  08/31/2026    ADVANCE CARE PLANNING  07/26/2027    LIPID  08/31/2028    DTAP/TDAP/TD IMMUNIZATION (3 - Td or Tdap) 06/17/2030    HEPATITIS C SCREENING  Completed    PHQ-2 (once per calendar year)  Completed    Pneumococcal Vaccine: 65+ Years  Completed    ZOSTER IMMUNIZATION  Completed    HPV IMMUNIZATION  Aged Out    MENINGITIS IMMUNIZATION  Aged Out    RSV MONOCLONAL ANTIBODY  Aged Out

## 2024-09-04 LAB
T4, FREE, NON-DIALYSIS - QUEST: 1.3 NG/DL (ref 0.8–1.8)
TSH SERPL-ACNC: 0.35 MIU/L (ref 0.4–4.5)

## 2024-09-04 RX ORDER — LEVOTHYROXINE SODIUM 88 UG/1
88 TABLET ORAL DAILY
Qty: 60 TABLET | Refills: 0 | Status: SHIPPED | OUTPATIENT
Start: 2024-09-04

## 2024-10-03 LAB — MAMMOGRAM: NORMAL

## 2024-11-05 DIAGNOSIS — E89.0 POSTSURGICAL HYPOTHYROIDISM: ICD-10-CM

## 2024-11-05 DIAGNOSIS — M10.00 IDIOPATHIC GOUT, UNSPECIFIED CHRONICITY, UNSPECIFIED SITE: ICD-10-CM

## 2024-11-05 LAB — URIC ACID (BFP): 5.5 (ref 2.5–7)

## 2024-11-05 PROCEDURE — 84550 ASSAY OF BLOOD/URIC ACID: CPT | Performed by: FAMILY MEDICINE

## 2024-11-05 PROCEDURE — 36415 COLL VENOUS BLD VENIPUNCTURE: CPT | Performed by: FAMILY MEDICINE

## 2024-11-06 LAB — TSH SERPL-ACNC: 0.55 MIU/L (ref 0.4–4.5)

## 2024-11-11 DIAGNOSIS — E89.0 POSTSURGICAL HYPOTHYROIDISM: ICD-10-CM

## 2024-11-11 NOTE — TELEPHONE ENCOUNTER
Pt was in for lab only to recheck thyroid on 11/05, now in range.    Nohemi Orlando is requesting a refill of:    Pending Prescriptions:                       Disp   Refills    levothyroxine (SYNTHROID/LEVOTHROID) 88 M*90 tab*0            Sig: Take 1 tablet (88 mcg) by mouth daily.

## 2024-11-12 RX ORDER — LEVOTHYROXINE SODIUM 88 UG/1
88 TABLET ORAL DAILY
Qty: 90 TABLET | Refills: 0 | Status: SHIPPED | OUTPATIENT
Start: 2024-11-12

## 2025-02-03 ENCOUNTER — MYC MEDICAL ADVICE (OUTPATIENT)
Dept: FAMILY MEDICINE | Facility: CLINIC | Age: 75
End: 2025-02-03

## 2025-02-03 DIAGNOSIS — E89.0 POSTSURGICAL HYPOTHYROIDISM: ICD-10-CM

## 2025-02-03 RX ORDER — LEVOTHYROXINE SODIUM 88 UG/1
88 TABLET ORAL DAILY
COMMUNITY
Start: 2025-02-03

## 2025-02-03 RX ORDER — LEVOTHYROXINE SODIUM 88 UG/1
88 TABLET ORAL DAILY
Qty: 30 TABLET | Refills: 0 | Status: SHIPPED | OUTPATIENT
Start: 2025-02-03

## 2025-02-03 NOTE — TELEPHONE ENCOUNTER
Pending Prescriptions:                       Disp   Refills    levothyroxine (SYNTHROID/LEVOTHROID) 88 M*90 tab*             Sig: TAKE 1 TABLET(88 MCG) BY MOUTH DAILY    Dr. Sheehan please review    Pt last refill was 11-12-24  Last tsh blood was 11-5-24 normal   I did not see when to rtc at px notes   Please fax deny or advise  Thank you  Suzanne

## 2025-02-03 NOTE — TELEPHONE ENCOUNTER
Refused Prescriptions:                       Disp   Refills    levothyroxine (SYNTHROID/LEVOTHROID) 88 MC*                Sig: TAKE 1 TABLET(88 MCG) BY MOUTH DAILY  Refused By: JESSI CORTEZ  Reason for Refusal: Request already responded to by other means (phone, fax, etc.)    Argentina Palacios

## 2025-02-25 DIAGNOSIS — M10.00 IDIOPATHIC GOUT, UNSPECIFIED CHRONICITY, UNSPECIFIED SITE: ICD-10-CM

## 2025-02-25 DIAGNOSIS — M10.071 ACUTE IDIOPATHIC GOUT OF RIGHT FOOT: ICD-10-CM

## 2025-02-25 RX ORDER — ALLOPURINOL 100 MG/1
100 TABLET ORAL DAILY
COMMUNITY
Start: 2025-02-25

## 2025-02-25 NOTE — TELEPHONE ENCOUNTER
Nohemi Orlando is requesting a refill of:    Refused Prescriptions:                       Disp   Refills    allopurinol (ZYLOPRIM) 100 MG tablet [Phar*                Sig: TAKE 1 TABLET(100 MG) BY MOUTH DAILY  Refused By: STEFAN PECK  Reason for Refusal: Patient needs appointment    Pt due for OV

## 2025-03-13 ENCOUNTER — TRANSFERRED RECORDS (OUTPATIENT)
Dept: FAMILY MEDICINE | Facility: CLINIC | Age: 75
End: 2025-03-13

## 2025-03-26 ENCOUNTER — HOSPITAL ENCOUNTER (OUTPATIENT)
Dept: ULTRASOUND IMAGING | Facility: CLINIC | Age: 75
Discharge: HOME | End: 2025-03-26
Attending: INTERNAL MEDICINE
Payer: MEDICARE

## 2025-03-26 DIAGNOSIS — E04.2 MULTIPLE THYROID NODULES: ICD-10-CM

## 2025-03-26 DIAGNOSIS — E04.1 THYROID NODULE: ICD-10-CM

## 2025-03-26 PROCEDURE — 272N000431 US BIOPSY THYROID FINE NEEDLE ASPIRATION

## 2025-03-26 PROCEDURE — 250N000009 HC RX 250: Performed by: RADIOLOGY

## 2025-03-26 RX ADMIN — LIDOCAINE HYDROCHLORIDE 10 ML: 10 INJECTION, SOLUTION EPIDURAL; INFILTRATION; INTRACAUDAL; PERINEURAL at 13:15

## 2025-03-26 NOTE — PROGRESS NOTES
Patient tolerated left thyroid nodule biopsy well by Dr. Padgett.  Bandage clean, dry and intact at time of discharge.  Patient states understanding of discharge instructions and left department in satisfactory condition.

## 2025-07-09 DIAGNOSIS — M10.071 ACUTE IDIOPATHIC GOUT OF RIGHT FOOT: ICD-10-CM

## 2025-07-09 DIAGNOSIS — M10.00 IDIOPATHIC GOUT, UNSPECIFIED CHRONICITY, UNSPECIFIED SITE: ICD-10-CM

## 2025-07-09 RX ORDER — ALLOPURINOL 100 MG/1
100 TABLET ORAL DAILY
COMMUNITY
Start: 2025-07-09

## 2025-07-09 NOTE — TELEPHONE ENCOUNTER
Nohemi Orlando is requesting a refill of:    Refused Prescriptions:                       Disp   Refills    allopurinol (ZYLOPRIM) 100 MG tablet [Phar*                Sig: TAKE 1 TABLET(100 MG) BY MOUTH DAILY  Refused By: STEFAN PECK  Reason for Refusal: Patient has requested refill too soon  Reason for Refusal Comment: Refills sent on 02/28/25 for 90 tab with 1 refill

## 2025-08-11 ENCOUNTER — OFFICE VISIT (OUTPATIENT)
Dept: FAMILY MEDICINE | Facility: CLINIC | Age: 75
End: 2025-08-11

## 2025-08-11 VITALS
TEMPERATURE: 97.2 F | BODY MASS INDEX: 38.61 KG/M2 | DIASTOLIC BLOOD PRESSURE: 78 MMHG | WEIGHT: 217.9 LBS | HEART RATE: 63 BPM | OXYGEN SATURATION: 97 % | HEIGHT: 63 IN | SYSTOLIC BLOOD PRESSURE: 138 MMHG

## 2025-08-11 DIAGNOSIS — M10.071 ACUTE IDIOPATHIC GOUT OF RIGHT FOOT: ICD-10-CM

## 2025-08-11 DIAGNOSIS — M10.00 IDIOPATHIC GOUT, UNSPECIFIED CHRONICITY, UNSPECIFIED SITE: ICD-10-CM

## 2025-08-11 DIAGNOSIS — E89.0 POSTSURGICAL HYPOTHYROIDISM: ICD-10-CM

## 2025-08-11 LAB
BUN SERPL-MCNC: 21 MG/DL (ref 7–25)
BUN/CREATININE RATIO: 21 (ref 6–32)
CALCIUM SERPL-MCNC: 9 MG/DL (ref 8.6–10.3)
CHLORIDE SERPLBLD-SCNC: 104 MMOL/L (ref 98–110)
CHOLEST SERPL-MCNC: 176 MG/DL (ref 0–199)
CHOLEST/HDLC SERPL: 5 {RATIO} (ref 0–5)
CO2 SERPL-SCNC: 27.5 MMOL/L (ref 20–32)
CREAT SERPL-MCNC: 0.99 MG/DL (ref 0.6–1.3)
GLUCOSE SERPL-MCNC: 83 MG/DL (ref 60–99)
HDLC SERPL-MCNC: 37 MG/DL (ref 40–150)
LDLC SERPL CALC-MCNC: 115 MG/DL (ref 0–129)
POTASSIUM SERPL-SCNC: 4.75 MMOL/L (ref 3.5–5.3)
SODIUM SERPL-SCNC: 139.6 MMOL/L (ref 135–146)
TRIGL SERPL-MCNC: 118 MG/DL (ref 0–149)
URIC ACID (BFP): 5.5 (ref 2.5–7)

## 2025-08-11 PROCEDURE — 80061 LIPID PANEL: CPT | Performed by: FAMILY MEDICINE

## 2025-08-11 PROCEDURE — 84550 ASSAY OF BLOOD/URIC ACID: CPT | Performed by: FAMILY MEDICINE

## 2025-08-11 PROCEDURE — 36415 COLL VENOUS BLD VENIPUNCTURE: CPT | Performed by: FAMILY MEDICINE

## 2025-08-11 PROCEDURE — 99214 OFFICE O/P EST MOD 30 MIN: CPT | Performed by: FAMILY MEDICINE

## 2025-08-11 PROCEDURE — G2211 COMPLEX E/M VISIT ADD ON: HCPCS | Performed by: FAMILY MEDICINE

## 2025-08-11 PROCEDURE — 80048 BASIC METABOLIC PNL TOTAL CA: CPT | Performed by: FAMILY MEDICINE

## 2025-08-11 RX ORDER — ALLOPURINOL 100 MG/1
100 TABLET ORAL DAILY
Qty: 90 TABLET | Refills: 1 | Status: SHIPPED | OUTPATIENT
Start: 2025-08-11 | End: 2025-08-11

## 2025-08-11 RX ORDER — LEVOTHYROXINE SODIUM 88 UG/1
88 TABLET ORAL DAILY
Qty: 90 TABLET | Refills: 1 | Status: SHIPPED | OUTPATIENT
Start: 2025-08-11

## 2025-08-11 RX ORDER — ALLOPURINOL 100 MG/1
200 TABLET ORAL DAILY
Qty: 180 TABLET | Refills: 1 | Status: SHIPPED | OUTPATIENT
Start: 2025-08-11

## 2025-08-12 LAB — TSH SERPL-ACNC: 0.63 MIU/L (ref 0.4–4.5)
